# Patient Record
Sex: MALE | Race: WHITE | NOT HISPANIC OR LATINO | Employment: OTHER | URBAN - METROPOLITAN AREA
[De-identification: names, ages, dates, MRNs, and addresses within clinical notes are randomized per-mention and may not be internally consistent; named-entity substitution may affect disease eponyms.]

---

## 2017-04-28 ENCOUNTER — GENERIC CONVERSION - ENCOUNTER (OUTPATIENT)
Dept: OTHER | Facility: OTHER | Age: 67
End: 2017-04-28

## 2017-05-25 ENCOUNTER — ALLSCRIPTS OFFICE VISIT (OUTPATIENT)
Dept: OTHER | Facility: OTHER | Age: 67
End: 2017-05-25

## 2017-08-28 ENCOUNTER — ALLSCRIPTS OFFICE VISIT (OUTPATIENT)
Dept: OTHER | Facility: OTHER | Age: 67
End: 2017-08-28

## 2017-09-25 ENCOUNTER — APPOINTMENT (OUTPATIENT)
Dept: LAB | Facility: MEDICAL CENTER | Age: 67
End: 2017-09-25
Payer: COMMERCIAL

## 2017-09-25 ENCOUNTER — TRANSCRIBE ORDERS (OUTPATIENT)
Dept: ADMINISTRATIVE | Facility: HOSPITAL | Age: 67
End: 2017-09-25

## 2017-09-25 DIAGNOSIS — E78.2 MIXED HYPERLIPIDEMIA: Primary | ICD-10-CM

## 2017-09-25 DIAGNOSIS — E78.2 MIXED HYPERLIPIDEMIA: ICD-10-CM

## 2017-09-25 DIAGNOSIS — I15.9 SECONDARY HYPERTENSION: ICD-10-CM

## 2017-09-25 DIAGNOSIS — I25.119 ATHEROSCLEROSIS OF NATIVE CORONARY ARTERY WITH ANGINA PECTORIS, UNSPECIFIED WHETHER NATIVE OR TRANSPLANTED HEART (HCC): ICD-10-CM

## 2017-09-25 DIAGNOSIS — Z95.1 POSTSURGICAL AORTOCORONARY BYPASS STATUS: ICD-10-CM

## 2017-09-25 LAB
ALBUMIN SERPL BCP-MCNC: 3.3 G/DL (ref 3.5–5)
ALP SERPL-CCNC: 73 U/L (ref 46–116)
ALT SERPL W P-5'-P-CCNC: 31 U/L (ref 12–78)
AST SERPL W P-5'-P-CCNC: 22 U/L (ref 5–45)
BILIRUB DIRECT SERPL-MCNC: 0.2 MG/DL (ref 0–0.2)
BILIRUB SERPL-MCNC: 0.66 MG/DL (ref 0.2–1)
CHOLEST SERPL-MCNC: 126 MG/DL (ref 50–200)
HDLC SERPL-MCNC: 44 MG/DL (ref 40–60)
LDLC SERPL CALC-MCNC: 60 MG/DL (ref 0–100)
PROT SERPL-MCNC: 6.9 G/DL (ref 6.4–8.2)
TRIGL SERPL-MCNC: 110 MG/DL

## 2017-09-25 PROCEDURE — 80061 LIPID PANEL: CPT

## 2017-09-25 PROCEDURE — 36415 COLL VENOUS BLD VENIPUNCTURE: CPT

## 2017-09-25 PROCEDURE — 80076 HEPATIC FUNCTION PANEL: CPT

## 2017-10-11 ENCOUNTER — ALLSCRIPTS OFFICE VISIT (OUTPATIENT)
Dept: OTHER | Facility: OTHER | Age: 67
End: 2017-10-11

## 2018-01-13 VITALS
BODY MASS INDEX: 33.45 KG/M2 | HEIGHT: 75 IN | SYSTOLIC BLOOD PRESSURE: 136 MMHG | WEIGHT: 269 LBS | DIASTOLIC BLOOD PRESSURE: 82 MMHG

## 2018-01-15 VITALS
HEIGHT: 75 IN | SYSTOLIC BLOOD PRESSURE: 114 MMHG | TEMPERATURE: 97.3 F | DIASTOLIC BLOOD PRESSURE: 74 MMHG | BODY MASS INDEX: 32.58 KG/M2 | WEIGHT: 262 LBS

## 2018-09-19 RX ORDER — CARVEDILOL PHOSPHATE 10 MG/1
1 CAPSULE, EXTENDED RELEASE ORAL 2 TIMES DAILY
COMMUNITY
End: 2019-03-25 | Stop reason: SDUPTHER

## 2018-09-19 RX ORDER — OLMESARTAN MEDOXOMIL 40 MG/1
1 TABLET ORAL DAILY
COMMUNITY
End: 2019-03-25 | Stop reason: SDUPTHER

## 2018-09-19 RX ORDER — ACYCLOVIR 50 MG/G
OINTMENT TOPICAL
COMMUNITY
Start: 2017-08-28 | End: 2020-11-16 | Stop reason: ALTCHOICE

## 2018-09-19 RX ORDER — EZETIMIBE 10 MG/1
10 TABLET ORAL
COMMUNITY
End: 2019-03-25 | Stop reason: SDUPTHER

## 2018-09-19 RX ORDER — ROSUVASTATIN CALCIUM 20 MG/1
20 TABLET, COATED ORAL
COMMUNITY
End: 2019-03-25 | Stop reason: SDUPTHER

## 2018-09-19 RX ORDER — MOMETASONE FUROATE 50 UG/1
2 SPRAY, METERED NASAL DAILY
COMMUNITY
Start: 2017-05-25 | End: 2019-03-25 | Stop reason: ALTCHOICE

## 2018-09-19 RX ORDER — ROSUVASTATIN CALCIUM 40 MG/1
1 TABLET, COATED ORAL DAILY
COMMUNITY
End: 2019-03-25 | Stop reason: ALTCHOICE

## 2018-09-19 RX ORDER — LANOLIN ALCOHOL/MO/W.PET/CERES
1 CREAM (GRAM) TOPICAL DAILY
COMMUNITY

## 2018-09-19 RX ORDER — VALACYCLOVIR HYDROCHLORIDE 1 G/1
1 TABLET, FILM COATED ORAL 3 TIMES DAILY
COMMUNITY
Start: 2017-08-28 | End: 2019-03-25 | Stop reason: ALTCHOICE

## 2018-09-24 ENCOUNTER — OFFICE VISIT (OUTPATIENT)
Dept: CARDIOLOGY CLINIC | Facility: CLINIC | Age: 68
End: 2018-09-24
Payer: COMMERCIAL

## 2018-09-24 VITALS
SYSTOLIC BLOOD PRESSURE: 130 MMHG | HEART RATE: 61 BPM | WEIGHT: 263.8 LBS | DIASTOLIC BLOOD PRESSURE: 70 MMHG | OXYGEN SATURATION: 98 % | HEIGHT: 75 IN | BODY MASS INDEX: 32.8 KG/M2

## 2018-09-24 DIAGNOSIS — E78.00 HYPERCHOLESTEREMIA: ICD-10-CM

## 2018-09-24 DIAGNOSIS — I10 ESSENTIAL HYPERTENSION: ICD-10-CM

## 2018-09-24 DIAGNOSIS — Z95.1 HX OF CABG: Primary | ICD-10-CM

## 2018-09-24 PROCEDURE — 99214 OFFICE O/P EST MOD 30 MIN: CPT | Performed by: INTERNAL MEDICINE

## 2018-09-24 PROCEDURE — 93000 ELECTROCARDIOGRAM COMPLETE: CPT | Performed by: INTERNAL MEDICINE

## 2018-09-24 NOTE — PROGRESS NOTES
Cardiology Follow Up    Shaggy White    1950  [id number]  [address] [facility]  [address] [place] [id number] [phone]    1  Hx of CABG in 2001  Lipid Panel with Direct LDL reflex   2  Essential hypertension  POCT ECG   3  Hypercholesteremia  Lipid Panel with Direct LDL reflex       Patient is from my prior practice  He has a history of coronary artery disease with CABG in 2001  Other diagnoses include hypertension, hyper lipidemia and sleep apnea  04/17/2013 stress test:  Exercised for 9 min by standard Milton protocol, normal LV systolic function 02%  Normal tomographic perfusion series  04/17/2013 echocardiogram:  Normal LV function, EF 55-59%, mild LV cavity enlargement  Mild concentric LVH  Severe left atrial enlargement  Aortic sclerosis  Interval History:   Patient is having no cardiac symptoms  He denies chest pain, shortness of breath, dizziness or lightheadedness, palpitations or leg edema  He and his wife are upset that they were denied life insurance due to having prior CABG  Patient Active Problem List   Diagnosis    ARMD (age related macular degeneration)    Hypercholesteremia    Hypertension    Coronary artery disease    Hx of CABG in 2001     Past Medical History:   Diagnosis Date    CAD (coronary artery disease)      Social History     Social History    Marital status: /Civil Union     Spouse name: N/A    Number of children: N/A    Years of education: N/A     Occupational History    Not on file  Social History Main Topics    Smoking status: Never Smoker    Smokeless tobacco: Not on file    Alcohol use Not on file    Drug use: Unknown    Sexual activity: Not on file     Other Topics Concern    Not on file     Social History Narrative    No narrative on file      No family history on file    Past Surgical History:   Procedure Laterality Date    ADENOIDECTOMY      CARPAL TUNNEL RELEASE Bilateral     CORONARY ARTERY BYPASS GRAFT  05/2001    x4    NASAL SEPTUM SURGERY      TONSILLECTOMY         Current Outpatient Prescriptions:     aspirin 81 MG tablet, Take by mouth, Disp: , Rfl:     carvedilol (COREG CR) 10 MG 24 hr capsule, Take 1 capsule by mouth 2 (two) times a day, Disp: , Rfl:     Docosahexaenoic Acid (DHA OMEGA 3) 100 MG CAPS, Take by mouth daily, Disp: , Rfl:     ezetimibe (ZETIA) 10 mg tablet, Take 10 mg by mouth, Disp: , Rfl:     folic acid (FOLVITE) 356 mcg tablet, Take 1 tablet by mouth daily, Disp: , Rfl:     Multiple Vitamin (DAILY VALUE MULTIVITAMIN PO), Take 1 tablet by mouth daily, Disp: , Rfl:     olmesartan (BENICAR) 40 mg tablet, Take 1 tablet by mouth daily, Disp: , Rfl:     Omega-3 Fatty Acids (FISH OIL PO), Take 2 g by mouth, Disp: , Rfl:     rosuvastatin (CRESTOR) 20 MG tablet, Take 20 mg by mouth, Disp: , Rfl:     acyclovir (ZOVIRAX) 5 % ointment, Apply topically, Disp: , Rfl:     mometasone (NASONEX) 50 mcg/act nasal spray, 2 sprays into each nostril daily, Disp: , Rfl:     rosuvastatin (CRESTOR) 40 MG tablet, Take 1 tablet by mouth daily, Disp: , Rfl:     valACYclovir (VALTREX) 1,000 mg tablet, Take 1 tablet by mouth 3 (three) times a day, Disp: , Rfl:   No Known Allergies    Results for orders placed or performed in visit on 09/24/18   POCT ECG    Narrative    Normal sinus rhythm at a rate of 61 beats per minute  Nonspecific ST T wave changes           Lab Results   Component Value Date    CHOL 124 08/14/2015    CHOL 126 07/23/2014     Lab Results   Component Value Date    HDL 44 09/25/2017    HDL 42 09/12/2016    HDL 43 08/14/2015     Lab Results   Component Value Date    LDLCALC 60 09/25/2017    LDLCALC 77 09/12/2016    LDLCALC 56 08/14/2015     Lab Results   Component Value Date    TRIG 110 09/25/2017    TRIG 148 09/12/2016    TRIG 125 08/14/2015     No components found for: CHOLHDL  Lab Results   Component Value Date    GLUCOSE 83 08/14/2015    CALCIUM 9 1 08/14/2015     08/14/2015    K 4 0 08/14/2015    CO2 27 5 08/14/2015     08/14/2015    BUN 18 08/14/2015    CREATININE 0 99 08/14/2015     Lab Results   Component Value Date     08/14/2015    K 4 0 08/14/2015     08/14/2015    CO2 27 5 08/14/2015    ANIONGAP 10 08/14/2015    BUN 18 08/14/2015    CREATININE 0 99 08/14/2015    GLUCOSE 83 08/14/2015    CALCIUM 9 1 08/14/2015    AST 22 09/25/2017    ALT 31 09/25/2017    ALKPHOS 73 09/25/2017    PROT 6 7 08/14/2015    BILITOT 1 36 (H) 08/14/2015         Review of Systems:  Review of Systems   Constitutional: Negative  HENT: Negative  Eyes: Negative  Respiratory: Negative for chest tightness and shortness of breath  Cardiovascular: Negative for chest pain, palpitations and leg swelling  Gastrointestinal: Negative  Endocrine: Negative  Musculoskeletal: Negative  Skin: Negative  Allergic/Immunologic: Negative  Neurological: Negative  Hematological: Negative  Psychiatric/Behavioral: Negative  Physical Exam:  /70 (BP Location: Right arm, Patient Position: Sitting, Cuff Size: Adult)   Pulse 61   Ht 6' 3" (1 905 m)   Wt 120 kg (263 lb 12 8 oz)   SpO2 98%   BMI 32 97 kg/m²   Physical Exam   Constitutional: He is oriented to person, place, and time  He appears well-developed and well-nourished  HENT:   Head: Normocephalic and atraumatic  Neck: Normal range of motion  Neck supple  No JVD present  No tracheal deviation present  Cardiovascular: Normal rate, regular rhythm, normal heart sounds and intact distal pulses  Pulmonary/Chest: Effort normal and breath sounds normal  No respiratory distress  He has no wheezes  He has no rales  Abdominal: Soft  Bowel sounds are normal    Musculoskeletal: He exhibits no edema  Neurological: He is alert and oriented to person, place, and time  Skin: Skin is warm and dry     Psychiatric: He has a normal mood and affect  His behavior is normal        Discussion/Summary:  1  Status post CABG  2  Hypertension well controlled  3    Hyperlipidemia

## 2018-09-27 ENCOUNTER — CLINICAL SUPPORT (OUTPATIENT)
Dept: FAMILY MEDICINE CLINIC | Facility: CLINIC | Age: 68
End: 2018-09-27
Payer: COMMERCIAL

## 2018-09-27 ENCOUNTER — APPOINTMENT (OUTPATIENT)
Dept: LAB | Facility: MEDICAL CENTER | Age: 68
End: 2018-09-27
Payer: COMMERCIAL

## 2018-09-27 DIAGNOSIS — Z95.1 HX OF CABG: ICD-10-CM

## 2018-09-27 DIAGNOSIS — Z23 NEED FOR INFLUENZA VACCINATION: Primary | ICD-10-CM

## 2018-09-27 DIAGNOSIS — E78.00 HYPERCHOLESTEREMIA: ICD-10-CM

## 2018-09-27 LAB
CHOLEST SERPL-MCNC: 129 MG/DL (ref 50–200)
HDLC SERPL-MCNC: 41 MG/DL (ref 40–60)
LDLC SERPL CALC-MCNC: 63 MG/DL (ref 0–100)
TRIGL SERPL-MCNC: 127 MG/DL

## 2018-09-27 PROCEDURE — 4040F PNEUMOC VAC/ADMIN/RCVD: CPT

## 2018-09-27 PROCEDURE — 90662 IIV NO PRSV INCREASED AG IM: CPT

## 2018-09-27 PROCEDURE — 36415 COLL VENOUS BLD VENIPUNCTURE: CPT

## 2018-09-27 PROCEDURE — G0008 ADMIN INFLUENZA VIRUS VAC: HCPCS

## 2018-09-27 PROCEDURE — 80061 LIPID PANEL: CPT

## 2019-03-20 RX ORDER — IMIQUIMOD 12.5 MG/.25G
CREAM TOPICAL
Refills: 0 | COMMUNITY
Start: 2018-12-27 | End: 2020-11-16 | Stop reason: ALTCHOICE

## 2019-03-25 ENCOUNTER — OFFICE VISIT (OUTPATIENT)
Dept: CARDIOLOGY CLINIC | Facility: CLINIC | Age: 69
End: 2019-03-25
Payer: COMMERCIAL

## 2019-03-25 VITALS
DIASTOLIC BLOOD PRESSURE: 80 MMHG | OXYGEN SATURATION: 98 % | BODY MASS INDEX: 20.86 KG/M2 | WEIGHT: 167.8 LBS | HEART RATE: 72 BPM | HEIGHT: 75 IN | SYSTOLIC BLOOD PRESSURE: 124 MMHG

## 2019-03-25 DIAGNOSIS — E78.00 HYPERCHOLESTEREMIA: ICD-10-CM

## 2019-03-25 DIAGNOSIS — E78.00 HYPERCHOLESTEREMIA: Primary | ICD-10-CM

## 2019-03-25 DIAGNOSIS — I25.10 CORONARY ARTERY DISEASE INVOLVING NATIVE CORONARY ARTERY OF NATIVE HEART WITHOUT ANGINA PECTORIS: ICD-10-CM

## 2019-03-25 DIAGNOSIS — Z95.1 HX OF CABG: ICD-10-CM

## 2019-03-25 DIAGNOSIS — I25.10 CORONARY ARTERY DISEASE INVOLVING NATIVE CORONARY ARTERY OF NATIVE HEART WITHOUT ANGINA PECTORIS: Primary | ICD-10-CM

## 2019-03-25 DIAGNOSIS — I10 ESSENTIAL HYPERTENSION: ICD-10-CM

## 2019-03-25 PROCEDURE — 99215 OFFICE O/P EST HI 40 MIN: CPT | Performed by: INTERNAL MEDICINE

## 2019-03-25 NOTE — PROGRESS NOTES
Cardiology Follow Up    Ignacia Montaño    1950  [id number]  [address] [facility]  [address] [place] [id number] [phone]    1  Coronary artery disease involving native coronary artery of native heart without angina pectoris     2  Hx of CABG in 2001     3  Essential hypertension     4  Hypercholesteremia         Patient is from my prior practice  He has a history of coronary artery disease with CABG in 2001  Other diagnoses include hypertension, hyper lipidemia and sleep apnea  04/17/2013 stress test:  Exercised for 9 min by standard [place] protocol, normal LV systolic function 12%  Normal tomographic perfusion series  04/17/2013 echocardiogram:  Normal LV function, EF 55-59%, mild LV cavity enlargement  Mild concentric LVH  Severe left atrial enlargement  Aortic sclerosis  Interval History:  Patient returns  He has no chest discomfort or shortness of breath  He denies palpitations  He denies dizziness, lightheadedness or near-syncope/syncope  The patient has wet macular degeneration  Both the patient and his wife are concerned that the aspirin he takes may increase the bleeding into the retina from the wet macular degeneration  He is getting shots into the eye every 6 weeks  They talked to their ophthalmologist and he would not discuss whether there is a relationship between wet macular degeneration and aspirin  If there is a relationship, I would not hesitate to stop the aspirin but if there is no relationship since the patient has coronary artery disease, I do not want to stop the aspirin unnecessarily      Patient Active Problem List   Diagnosis    ARMD (age related macular degeneration)    Hypercholesteremia    Hypertension    CAD (coronary artery disease)    Hx of CABG in 2001     Past Medical History:   Diagnosis Date    CAD (coronary artery disease) Social History     Socioeconomic History    Marital status: /Civil Union     Spouse name: Not on file    Number of children: Not on file    Years of education: Not on file    Highest education level: Not on file   Occupational History    Not on file   Social Needs    Financial resource strain: Not on file    Food insecurity:     Worry: Not on file     Inability: Not on file    Transportation needs:     Medical: Not on file     Non-medical: Not on file   Tobacco Use    Smoking status: Never Smoker   Substance and Sexual Activity    Alcohol use: Not on file    Drug use: Not on file    Sexual activity: Not on file   Lifestyle    Physical activity:     Days per week: Not on file     Minutes per session: Not on file    Stress: Not on file   Relationships    Social connections:     Talks on phone: Not on file     Gets together: Not on file     Attends Latter day service: Not on file     Active member of club or organization: Not on file     Attends meetings of clubs or organizations: Not on file     Relationship status: Not on file    Intimate partner violence:     Fear of current or ex partner: Not on file     Emotionally abused: Not on file     Physically abused: Not on file     Forced sexual activity: Not on file   Other Topics Concern    Not on file   Social History Narrative    Not on file      No family history on file    Past Surgical History:   Procedure Laterality Date    ADENOIDECTOMY      CARPAL TUNNEL RELEASE Bilateral     CORONARY ARTERY BYPASS GRAFT  05/2001    x4    NASAL SEPTUM SURGERY      TONSILLECTOMY         Current Outpatient Medications:     acyclovir (ZOVIRAX) 5 % ointment, Apply topically, Disp: , Rfl:     aspirin 81 MG tablet, Take by mouth, Disp: , Rfl:     carvedilol (COREG CR) 10 MG 24 hr capsule, Take 1 capsule by mouth 2 (two) times a day, Disp: , Rfl:     Docosahexaenoic Acid (DHA OMEGA 3) 100 MG CAPS, Take by mouth daily, Disp: , Rfl:     ezetimibe (ZETIA) 10 mg tablet, Take 10 mg by mouth, Disp: , Rfl:     folic acid (FOLVITE) 657 mcg tablet, Take 1 tablet by mouth daily, Disp: , Rfl:     imiquimod (ALDARA) 5 % cream, , Disp: , Rfl: 0    Multiple Vitamin (DAILY VALUE MULTIVITAMIN PO), Take 1 tablet by mouth daily, Disp: , Rfl:     olmesartan (BENICAR) 40 mg tablet, Take 1 tablet by mouth daily, Disp: , Rfl:     Omega-3 Fatty Acids (FISH OIL PO), Take 2 g by mouth, Disp: , Rfl:     rosuvastatin (CRESTOR) 20 MG tablet, Take 20 mg by mouth, Disp: , Rfl:   No Known Allergies    No results found for this visit on 03/25/19  Lab Results   Component Value Date    CHOL 124 08/14/2015    CHOL 126 07/23/2014     Lab Results   Component Value Date    HDL 41 09/27/2018    HDL 44 09/25/2017    HDL 42 09/12/2016     Lab Results   Component Value Date    LDLCALC 63 09/27/2018    LDLCALC 60 09/25/2017    LDLCALC 77 09/12/2016     Lab Results   Component Value Date    TRIG 127 09/27/2018    TRIG 110 09/25/2017    TRIG 148 09/12/2016     No results found for: CHOLHDL  Lab Results   Component Value Date    GLUCOSE 83 08/14/2015    CALCIUM 9 1 08/14/2015     08/14/2015    K 4 0 08/14/2015    CO2 27 5 08/14/2015     08/14/2015    BUN 18 08/14/2015    CREATININE 0 99 08/14/2015     Lab Results   Component Value Date     08/14/2015    K 4 0 08/14/2015     08/14/2015    CO2 27 5 08/14/2015    ANIONGAP 10 08/14/2015    BUN 18 08/14/2015    CREATININE 0 99 08/14/2015    CALCIUM 9 1 08/14/2015    AST 22 09/25/2017    ALT 31 09/25/2017    ALKPHOS 73 09/25/2017    PROT 6 7 08/14/2015    TP 6 9 09/25/2017    BILITOT 1 36 (H) 08/14/2015    TBILI 0 66 09/25/2017         Review of Systems:  Review of Systems   Constitutional: Negative for activity change  Respiratory: Negative for cough, chest tightness, shortness of breath and wheezing  Cardiovascular: Negative for chest pain, palpitations and leg swelling  Musculoskeletal: Negative for gait problem  Skin: Negative for color change  Neurological: Negative for dizziness, tremors, syncope, weakness, light-headedness and headaches  Psychiatric/Behavioral: Negative for agitation and confusion  Physical Exam:  /80   Pulse 72   Ht 6' 3" (1 905 m)   Wt 76 1 kg (167 lb 12 8 oz)   SpO2 98%   BMI 20 97 kg/m²   Physical Exam   Constitutional: He is oriented to person, place, and time  He appears well-developed and well-nourished  No distress  HENT:   Head: Normocephalic and atraumatic  Neck: No JVD present  Cardiovascular: Normal rate, regular rhythm, normal heart sounds and intact distal pulses  Exam reveals no gallop and no friction rub  No murmur heard  Pulmonary/Chest: Effort normal and breath sounds normal  No respiratory distress  He has no wheezes  He has no rales  He exhibits no tenderness  Abdominal: Soft  Bowel sounds are normal  He exhibits no distension  Musculoskeletal: He exhibits no edema  Neurological: He is alert and oriented to person, place, and time  Skin: Skin is warm and dry  Psychiatric: He has a normal mood and affect  His behavior is normal        Discussion/Summary:  1  Will see if I can find out anything about aspirin and wet macular degeneration  2  Return in 6 months

## 2019-03-27 RX ORDER — ROSUVASTATIN CALCIUM 20 MG/1
20 TABLET, COATED ORAL DAILY
Qty: 90 TABLET | Refills: 2 | Status: SHIPPED | OUTPATIENT
Start: 2019-03-27

## 2019-03-27 RX ORDER — EZETIMIBE 10 MG/1
10 TABLET ORAL DAILY
Qty: 90 TABLET | Refills: 3 | Status: SHIPPED | OUTPATIENT
Start: 2019-03-27

## 2019-03-27 RX ORDER — CARVEDILOL PHOSPHATE 10 MG/1
10 CAPSULE, EXTENDED RELEASE ORAL 2 TIMES DAILY
Qty: 180 CAPSULE | Refills: 2 | Status: SHIPPED | OUTPATIENT
Start: 2019-03-27

## 2019-03-27 RX ORDER — OLMESARTAN MEDOXOMIL 40 MG/1
40 TABLET ORAL DAILY
Qty: 90 TABLET | Refills: 2 | Status: SHIPPED | OUTPATIENT
Start: 2019-03-27

## 2019-08-06 ENCOUNTER — TELEPHONE (OUTPATIENT)
Dept: CARDIOLOGY CLINIC | Facility: CLINIC | Age: 69
End: 2019-08-06

## 2019-08-06 NOTE — TELEPHONE ENCOUNTER
Wife called request release of medical records to Dr Celi Lane  Mailed medical release form to home to be signed with already stamped return envelope  Explained a signed release is necessay

## 2019-08-06 NOTE — TELEPHONE ENCOUNTER
Wife called back are driving to office to sign release   pulled addressed envelope from mail and gave to  to have when patient arrives

## 2019-09-04 ENCOUNTER — APPOINTMENT (OUTPATIENT)
Dept: LAB | Facility: MEDICAL CENTER | Age: 69
End: 2019-09-04
Payer: COMMERCIAL

## 2019-09-04 ENCOUNTER — TRANSCRIBE ORDERS (OUTPATIENT)
Dept: ADMINISTRATIVE | Facility: HOSPITAL | Age: 69
End: 2019-09-04

## 2019-09-04 DIAGNOSIS — E78.00 HYPERCHOLESTEREMIA: ICD-10-CM

## 2019-09-04 DIAGNOSIS — I25.10 CORONARY ARTERY DISEASE INVOLVING NATIVE CORONARY ARTERY OF NATIVE HEART WITHOUT ANGINA PECTORIS: ICD-10-CM

## 2019-09-04 DIAGNOSIS — E78.00 HYPERCHOLESTEREMIA: Primary | ICD-10-CM

## 2019-09-04 LAB
CHOLEST SERPL-MCNC: 150 MG/DL (ref 50–200)
HDLC SERPL-MCNC: 41 MG/DL (ref 40–60)
LDLC SERPL CALC-MCNC: 81 MG/DL (ref 0–100)
NONHDLC SERPL-MCNC: 109 MG/DL
TRIGL SERPL-MCNC: 138 MG/DL

## 2019-09-04 PROCEDURE — 36415 COLL VENOUS BLD VENIPUNCTURE: CPT

## 2019-09-04 PROCEDURE — 80061 LIPID PANEL: CPT

## 2020-02-26 ENCOUNTER — APPOINTMENT (OUTPATIENT)
Dept: LAB | Facility: HOSPITAL | Age: 70
End: 2020-02-26
Payer: COMMERCIAL

## 2020-02-26 ENCOUNTER — TRANSCRIBE ORDERS (OUTPATIENT)
Dept: ADMINISTRATIVE | Facility: HOSPITAL | Age: 70
End: 2020-02-26

## 2020-02-26 ENCOUNTER — HOSPITAL ENCOUNTER (OUTPATIENT)
Dept: NON INVASIVE DIAGNOSTICS | Facility: HOSPITAL | Age: 70
Discharge: HOME | End: 2020-02-26
Payer: COMMERCIAL

## 2020-02-26 DIAGNOSIS — M79.641 RIGHT HAND PAIN: ICD-10-CM

## 2020-02-26 DIAGNOSIS — Z01.818 OTHER SPECIFIED PRE-OPERATIVE EXAMINATION: ICD-10-CM

## 2020-02-26 DIAGNOSIS — M79.641 RIGHT HAND PAIN: Primary | ICD-10-CM

## 2020-02-26 DIAGNOSIS — Z01.810 PRE-OPERATIVE CARDIOVASCULAR EXAMINATION: ICD-10-CM

## 2020-02-26 LAB
ANION GAP SERPL CALCULATED.3IONS-SCNC: 8 MMOL/L (ref 4–13)
BUN SERPL-MCNC: 15 MG/DL (ref 5–25)
CALCIUM SERPL-MCNC: 9.1 MG/DL (ref 8.3–10.1)
CHLORIDE SERPL-SCNC: 105 MMOL/L (ref 100–108)
CO2 SERPL-SCNC: 28 MMOL/L (ref 21–32)
CREAT SERPL-MCNC: 1 MG/DL (ref 0.6–1.3)
ERYTHROCYTE [DISTWIDTH] IN BLOOD BY AUTOMATED COUNT: 12.6 % (ref 11.6–15.1)
GFR SERPL CREATININE-BSD FRML MDRD: 76 ML/MIN/1.73SQ M
GLUCOSE SERPL-MCNC: 110 MG/DL (ref 65–140)
HCT VFR BLD AUTO: 42.8 % (ref 36.5–49.3)
HGB BLD-MCNC: 14.3 G/DL (ref 12–17)
MCH RBC QN AUTO: 31.9 PG (ref 26.8–34.3)
MCHC RBC AUTO-ENTMCNC: 33.4 G/DL (ref 31.4–37.4)
MCV RBC AUTO: 96 FL (ref 82–98)
PLATELET # BLD AUTO: 296 THOUSANDS/UL (ref 149–390)
PMV BLD AUTO: 10 FL (ref 8.9–12.7)
POTASSIUM SERPL-SCNC: 4 MMOL/L (ref 3.5–5.3)
RBC # BLD AUTO: 4.48 MILLION/UL (ref 3.88–5.62)
SODIUM SERPL-SCNC: 141 MMOL/L (ref 136–145)
WBC # BLD AUTO: 7.42 THOUSAND/UL (ref 4.31–10.16)

## 2020-02-26 PROCEDURE — 85027 COMPLETE CBC AUTOMATED: CPT

## 2020-02-26 PROCEDURE — 80048 BASIC METABOLIC PNL TOTAL CA: CPT

## 2020-02-26 PROCEDURE — 36415 COLL VENOUS BLD VENIPUNCTURE: CPT

## 2020-02-26 PROCEDURE — 93005 ELECTROCARDIOGRAM TRACING: CPT

## 2020-02-27 ENCOUNTER — TELEPHONE (OUTPATIENT)
Dept: FAMILY MEDICINE CLINIC | Facility: CLINIC | Age: 70
End: 2020-02-27

## 2020-02-27 LAB
ATRIAL RATE: 74 BPM
P AXIS: 79 DEGREES
PR INTERVAL: 150 MS
QRS AXIS: 78 DEGREES
QRSD INTERVAL: 98 MS
QT INTERVAL: 414 MS
QTC INTERVAL: 459 MS
T WAVE AXIS: 88 DEGREES
VENTRICULAR RATE: 74 BPM

## 2020-02-27 PROCEDURE — 93010 ELECTROCARDIOGRAM REPORT: CPT | Performed by: INTERNAL MEDICINE

## 2020-02-27 NOTE — TELEPHONE ENCOUNTER
Patient is having had surgery and he had an ekg, which showed st & t wave abnormality    His ekg from 2018 said st abnormality   He is upset, he wants your opinion and he is going to call his cardiologist

## 2020-02-28 NOTE — TELEPHONE ENCOUNTER
I looked at both the tracings from 2019 and this 1 and while there have been some very minimal changes I do not see anything that is so serious that it makes me want to put him in the hospital I do not think he has a high risk for an impending heart attack however I totally agree he should make his cardiologist aware of this and he should get his cardiologist clearance for any surgery

## 2020-08-22 ENCOUNTER — HOSPITAL ENCOUNTER (EMERGENCY)
Facility: HOSPITAL | Age: 70
Discharge: HOME | End: 2020-08-22
Attending: EMERGENCY MEDICINE
Payer: COMMERCIAL

## 2020-08-22 ENCOUNTER — APPOINTMENT (EMERGENCY)
Dept: RADIOLOGY | Facility: HOSPITAL | Age: 70
End: 2020-08-22
Payer: COMMERCIAL

## 2020-08-22 VITALS
DIASTOLIC BLOOD PRESSURE: 80 MMHG | BODY MASS INDEX: 33.55 KG/M2 | WEIGHT: 269.84 LBS | RESPIRATION RATE: 18 BRPM | HEIGHT: 75 IN | HEART RATE: 61 BPM | OXYGEN SATURATION: 96 % | TEMPERATURE: 98 F | SYSTOLIC BLOOD PRESSURE: 135 MMHG
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
## 2020-08-22 DIAGNOSIS — S49.91XA RIGHT SHOULDER INJURY: ICD-10-CM

## 2020-08-22 DIAGNOSIS — S50.319A ELBOW ABRASION: ICD-10-CM

## 2020-08-22 PROCEDURE — 90715 TDAP VACCINE 7 YRS/> IM: CPT | Performed by: PHYSICIAN ASSISTANT

## 2020-08-22 PROCEDURE — 96372 THER/PROPH/DIAG INJ SC/IM: CPT

## 2020-08-22 PROCEDURE — 90471 IMMUNIZATION ADMIN: CPT

## 2020-08-22 PROCEDURE — 99284 EMERGENCY DEPT VISIT MOD MDM: CPT | Performed by: PHYSICIAN ASSISTANT

## 2020-08-22 PROCEDURE — 99284 EMERGENCY DEPT VISIT MOD MDM: CPT

## 2020-08-22 PROCEDURE — 73030 X-RAY EXAM OF SHOULDER: CPT

## 2020-08-22 PROCEDURE — 73080 X-RAY EXAM OF ELBOW: CPT

## 2020-08-22 RX ORDER — KETOROLAC TROMETHAMINE 30 MG/ML
30 INJECTION, SOLUTION INTRAMUSCULAR; INTRAVENOUS ONCE
Status: COMPLETED | OUTPATIENT
Start: 2020-08-22 | End: 2020-08-22

## 2020-08-22 RX ADMIN — TETANUS TOXOID, REDUCED DIPHTHERIA TOXOID AND ACELLULAR PERTUSSIS VACCINE, ADSORBED 0.5 ML: 5; 2.5; 8; 8; 2.5 SUSPENSION INTRAMUSCULAR at 10:08

## 2020-08-22 RX ADMIN — KETOROLAC TROMETHAMINE 30 MG: 30 INJECTION, SOLUTION INTRAMUSCULAR at 10:56

## 2020-08-22 NOTE — ED PROVIDER NOTES
History  Chief Complaint   Patient presents with   Julian Fall     While walking twisted ankle and fell onto right side  C/o pain right shoulder , elbow  Patient presents to the emergency department today for evaluation of right shoulder and right elbow pain status post mechanical fall  He presents ambulatory via private vehicle with his wife  He provides his own history stating within the last hour while out for a morning walk, he tripped over some uneven ground causing to fall on the right shoulder right elbow  He initially states the left ankle inverted but he denies any pain and has been ambulatory since the fall  He denies striking his head neck or back and denies pain in those regions  He denies any chest pain shortness of breath abdominal pain nausea vomiting  He denies any pain other than the right shoulder right elbow pain  Abrasion right elbow  Unsure of last tetanus update  No daily blood thinners  Prior to Admission Medications   Prescriptions Last Dose Informant Patient Reported? Taking?    Docosahexaenoic Acid (DHA OMEGA 3) 100 MG CAPS  Self Yes Yes   Sig: Take by mouth daily   Multiple Vitamin (DAILY VALUE MULTIVITAMIN PO)  Self Yes Yes   Sig: Take 1 tablet by mouth daily   Multiple Vitamins-Minerals (PRESERVISION AREDS 2 PO)   Yes Yes   Sig: Take 1 capsule by mouth 2 (two) times a day   Omega-3 Fatty Acids (FISH OIL PO)  Self Yes No   Sig: Take 2 g by mouth   acyclovir (ZOVIRAX) 5 % ointment  Self Yes No   Sig: Apply topically   aspirin 81 MG tablet  Self Yes Yes   Sig: Take by mouth   carvedilol (COREG CR) 10 MG 24 hr capsule   No Yes   Sig: Take 1 capsule (10 mg total) by mouth 2 (two) times a day   ezetimibe (ZETIA) 10 mg tablet   No Yes   Sig: Take 1 tablet (10 mg total) by mouth daily   folic acid (FOLVITE) 309 mcg tablet  Self Yes Yes   Sig: Take 1 tablet by mouth daily   imiquimod (ALDARA) 5 % cream  Self Yes No   olmesartan (BENICAR) 40 mg tablet   No Yes   Sig: Take 1 tablet (40 mg total) by mouth daily   rosuvastatin (CRESTOR) 20 MG tablet   No Yes   Sig: Take 1 tablet (20 mg total) by mouth daily      Facility-Administered Medications: None       Past Medical History:   Diagnosis Date    CAD (coronary artery disease)        Past Surgical History:   Procedure Laterality Date    ADENOIDECTOMY      CARPAL TUNNEL RELEASE Bilateral     CORONARY ARTERY BYPASS GRAFT  05/2001    x4    NASAL SEPTUM SURGERY      TONSILLECTOMY         History reviewed  No pertinent family history  I have reviewed and agree with the history as documented  E-Cigarette/Vaping    E-Cigarette Use Never User      E-Cigarette/Vaping Substances     Social History     Tobacco Use    Smoking status: Never Smoker    Smokeless tobacco: Never Used   Substance Use Topics    Alcohol use: Yes     Frequency: 2-4 times a month    Drug use: Never       Review of Systems   Constitutional: Negative  Negative for activity change, appetite change, chills, diaphoresis, fatigue, fever and unexpected weight change  HENT: Negative  Negative for sore throat, trouble swallowing and voice change  Eyes: Negative  Respiratory: Negative  Negative for cough, chest tightness, shortness of breath and wheezing  Cardiovascular: Negative  Negative for chest pain, palpitations and leg swelling  Gastrointestinal: Negative  Negative for abdominal pain, blood in stool, nausea and vomiting  Endocrine: Negative  Genitourinary: Negative  Negative for flank pain and hematuria  Musculoskeletal: Negative for arthralgias, back pain, gait problem, joint swelling, myalgias, neck pain and neck stiffness  Right shoulder right elbow pain   Skin: Positive for wound  Negative for rash  Wound right elbow   Allergic/Immunologic: Negative  Neurological: Negative  Negative for dizziness, seizures, syncope, weakness, light-headedness and headaches  Hematological: Negative  Psychiatric/Behavioral: Negative  All other systems reviewed and are negative  Physical Exam  Physical Exam  Vitals signs reviewed  Constitutional:       General: He is not in acute distress  Appearance: He is well-developed  He is not ill-appearing or diaphoretic  HENT:      Right Ear: External ear normal  No swelling  Tympanic membrane is not bulging  Left Ear: External ear normal  No swelling  Tympanic membrane is not bulging  Nose: Nose normal       Mouth/Throat:      Pharynx: No oropharyngeal exudate  Eyes:      General: Lids are normal       Conjunctiva/sclera: Conjunctivae normal       Pupils: Pupils are equal, round, and reactive to light  Neck:      Musculoskeletal: Normal range of motion and neck supple  Normal range of motion  No edema  Thyroid: No thyromegaly  Vascular: No JVD  Trachea: No tracheal deviation  Cardiovascular:      Rate and Rhythm: Normal rate and regular rhythm  Pulses: Normal pulses  Heart sounds: Normal heart sounds  No murmur  No friction rub  No gallop  Pulmonary:      Effort: Pulmonary effort is normal  No respiratory distress  Breath sounds: Normal breath sounds  No stridor  No wheezing or rales  Chest:      Chest wall: No tenderness  Abdominal:      General: Bowel sounds are normal  There is no distension  Palpations: Abdomen is soft  There is no mass  Tenderness: There is no abdominal tenderness  There is no guarding or rebound  Negative signs include Hutchins's sign  Hernia: No hernia is present  Musculoskeletal: Normal range of motion  General: Tenderness present  Comments: Tenderness right anterior elbow  No clavicular tenderness  No scapular tenderness  No mid shaft humerus tenderness  There is minimal right elbow tenderness  Normal radial pulse  Capillary refill is brisk less than 2 seconds with normal sensation   Lymphadenopathy:      Cervical: No cervical adenopathy     Skin:     General: Skin is warm and dry       Capillary Refill: Capillary refill takes less than 2 seconds  Coloration: Skin is not pale  Findings: No erythema or rash  Comments: Abrasion superficial right olecranon   Neurological:      Mental Status: He is alert and oriented to person, place, and time  GCS: GCS eye subscore is 4  GCS verbal subscore is 5  GCS motor subscore is 6  Cranial Nerves: No cranial nerve deficit  Sensory: No sensory deficit  Deep Tendon Reflexes: Reflexes are normal and symmetric  Psychiatric:         Speech: Speech normal          Behavior: Behavior normal          Vital Signs  ED Triage Vitals [08/22/20 0949]   Temperature Pulse Respirations Blood Pressure SpO2   98 °F (36 7 °C) 70 16 (!) 185/103 97 %      Temp Source Heart Rate Source Patient Position - Orthostatic VS BP Location FiO2 (%)   Temporal Monitor Sitting Left arm --      Pain Score       8           Vitals:    08/22/20 0949 08/22/20 1009 08/22/20 1015   BP: (!) 185/103 145/76 133/73   Pulse: 70 64 65   Patient Position - Orthostatic VS: Sitting Sitting Sitting         Visual Acuity  Visual Acuity      Most Recent Value   L Pupil Size (mm)  3   R Pupil Size (mm)  3          ED Medications  Medications   ketorolac (TORADOL) injection 30 mg (has no administration in time range)   tetanus-diphtheria-acellular pertussis (BOOSTRIX) IM injection 0 5 mL (0 5 mL Intramuscular Given 8/22/20 1008)       Diagnostic Studies  Results Reviewed     None                 XR shoulder 2+ views RIGHT   ED Interpretation by Gal Owusu PA-C (08/22 1031)   No evidence of fracture or dislocation  XR elbow 3+ vw RIGHT   ED Interpretation by Gal Owusu PA-C (08/22 1031)   No evidence of joint effusion or displaced fracture                   Procedures  Procedures         ED Course  ED Course as of Aug 22 1040   Sat Aug 22, 2020   4750 Blood Pressure(!): 185/103   0954 Temperature: 98 °F (36 7 °C)   0954 Pulse: 70   0954 Respirations: 16   0954 SpO2: 97 %       US AUDIT      Most Recent Value   Initial Alcohol Screen: US AUDIT-C    1  How often do you have a drink containing alcohol? 2 Filed at: 08/22/2020 0958   2  How many drinks containing alcohol do you have on a typical day you are drinking? 1 Filed at: 08/22/2020 0958   3b  FEMALE Any Age, or MALE 65+: How often do you have 4 or more drinks on one occassion? 0 Filed at: 08/22/2020 0958   Audit-C Score  3 Filed at: 08/22/2020 6646                  DONTAE/DAST-10      Most Recent Value   How many times in the past year have you    Used an illegal drug or used a prescription medication for non-medical reasons? Never Filed at: 08/22/2020 7671                                MDM      Disposition  Final diagnoses:   Fall, initial encounter   Elbow abrasion   Right shoulder injury     Time reflects when diagnosis was documented in both MDM as applicable and the Disposition within this note     Time User Action Codes Description Comment    8/22/2020 10:35 AM Anastacia ROMERO Add [V23  GZOI] Fall, initial encounter     8/22/2020 10:35 AM Anastacia ROMERO Add [M39 676Y] Elbow abrasion     8/22/2020 10:35 AM Peter Trevino Add [V74 [id number]] Right shoulder injury       ED Disposition     ED Disposition Condition Date/Time Comment    Discharge Stable Sat Aug 22, 2020 10:35 AM Vicky Denis Sr  discharge to home/self care              Follow-up Information     Follow up With Specialties Details Why Contact Info Additional Information    Walker Koehler DO Family Medicine Schedule an appointment as soon as possible for a visit  As needed [address]  [facility] [address] [facility] Schedule an appointment as soon as possible for a visit   [address] [facility] [address]  [address] [facility] Rachel Alcocer [address] [facility], [place], [place], [address] Patient's Medications   Discharge Prescriptions    No medications on file     No discharge procedures on file      PDMP Review     None          ED Provider  Electronically Signed by           Liang Arevalo PA-C  08/22/20 6468

## 2020-08-25 ENCOUNTER — TRANSCRIBE ORDERS (OUTPATIENT)
Dept: ADMINISTRATIVE | Facility: HOSPITAL | Age: 70
End: 2020-08-25

## 2020-08-25 DIAGNOSIS — M25.511 ACUTE PAIN OF RIGHT SHOULDER: Primary | ICD-10-CM

## 2020-08-31 ENCOUNTER — HOSPITAL ENCOUNTER (OUTPATIENT)
Dept: MRI IMAGING | Facility: HOSPITAL | Age: 70
Discharge: HOME | End: 2020-08-31
Payer: COMMERCIAL

## 2020-08-31 DIAGNOSIS — M25.511 ACUTE PAIN OF RIGHT SHOULDER: ICD-10-CM

## 2020-08-31 PROCEDURE — G1004 CDSM NDSC: HCPCS

## 2020-08-31 PROCEDURE — 73221 MRI JOINT UPR EXTREM W/O DYE: CPT

## 2020-10-09 ENCOUNTER — LAB (OUTPATIENT)
Dept: LAB | Facility: MEDICAL CENTER | Age: 70
End: 2020-10-09
Payer: COMMERCIAL

## 2020-10-09 ENCOUNTER — TRANSCRIBE ORDERS (OUTPATIENT)
Dept: LAB | Facility: MEDICAL CENTER | Age: 70
End: 2020-10-09

## 2020-10-09 DIAGNOSIS — I10 ESSENTIAL HYPERTENSION, MALIGNANT: ICD-10-CM

## 2020-10-09 DIAGNOSIS — E78.00 PURE HYPERCHOLESTEROLEMIA: ICD-10-CM

## 2020-10-09 DIAGNOSIS — E78.00 PURE HYPERCHOLESTEROLEMIA: Primary | ICD-10-CM

## 2020-10-09 LAB
ALBUMIN SERPL BCP-MCNC: 4 G/DL (ref 3.5–5)
ALP SERPL-CCNC: 95 U/L (ref 46–116)
ALT SERPL W P-5'-P-CCNC: 33 U/L (ref 12–78)
ANION GAP SERPL CALCULATED.3IONS-SCNC: 4 MMOL/L (ref 4–13)
AST SERPL W P-5'-P-CCNC: 20 U/L (ref 5–45)
BILIRUB SERPL-MCNC: 1.07 MG/DL (ref 0.2–1)
BUN SERPL-MCNC: 12 MG/DL (ref 5–25)
CALCIUM SERPL-MCNC: 10 MG/DL (ref 8.3–10.1)
CHLORIDE SERPL-SCNC: 106 MMOL/L (ref 100–108)
CHOLEST SERPL-MCNC: 152 MG/DL (ref 50–200)
CO2 SERPL-SCNC: 32 MMOL/L (ref 21–32)
CREAT SERPL-MCNC: 1 MG/DL (ref 0.6–1.3)
GFR SERPL CREATININE-BSD FRML MDRD: 76 ML/MIN/1.73SQ M
GLUCOSE P FAST SERPL-MCNC: 115 MG/DL (ref 65–99)
HDLC SERPL-MCNC: 39 MG/DL
LDLC SERPL CALC-MCNC: 84 MG/DL (ref 0–100)
NONHDLC SERPL-MCNC: 113 MG/DL
POTASSIUM SERPL-SCNC: 4 MMOL/L (ref 3.5–5.3)
PROT SERPL-MCNC: 7.6 G/DL (ref 6.4–8.2)
SODIUM SERPL-SCNC: 142 MMOL/L (ref 136–145)
TRIGL SERPL-MCNC: 144 MG/DL

## 2020-10-09 PROCEDURE — 80053 COMPREHEN METABOLIC PANEL: CPT

## 2020-10-09 PROCEDURE — 80061 LIPID PANEL: CPT

## 2020-10-09 PROCEDURE — 36415 COLL VENOUS BLD VENIPUNCTURE: CPT

## 2020-10-12 ENCOUNTER — TELEPHONE (OUTPATIENT)
Dept: FAMILY MEDICINE CLINIC | Facility: CLINIC | Age: 70
End: 2020-10-12

## 2020-11-09 ENCOUNTER — HOSPITAL ENCOUNTER (OUTPATIENT)
Dept: RADIOLOGY | Facility: HOSPITAL | Age: 70
Discharge: HOME | End: 2020-11-09
Payer: COMMERCIAL

## 2020-11-09 ENCOUNTER — LAB (OUTPATIENT)
Dept: LAB | Facility: HOSPITAL | Age: 70
End: 2020-11-09
Payer: COMMERCIAL

## 2020-11-09 ENCOUNTER — TRANSCRIBE ORDERS (OUTPATIENT)
Dept: ADMINISTRATIVE | Facility: HOSPITAL | Age: 70
End: 2020-11-09

## 2020-11-09 DIAGNOSIS — Z01.818 OTHER SPECIFIED PRE-OPERATIVE EXAMINATION: ICD-10-CM

## 2020-11-09 DIAGNOSIS — N21.0 CALCULUS IN DIVERTICULUM OF BLADDER: ICD-10-CM

## 2020-11-09 DIAGNOSIS — Z01.818 OTHER SPECIFIED PRE-OPERATIVE EXAMINATION: Primary | ICD-10-CM

## 2020-11-09 LAB
ANION GAP SERPL CALCULATED.3IONS-SCNC: 4 MMOL/L (ref 4–13)
APTT PPP: 25 SECONDS (ref 23–37)
BASOPHILS # BLD AUTO: 0.04 THOUSANDS/ΜL (ref 0–0.1)
BASOPHILS NFR BLD AUTO: 1 % (ref 0–1)
BILIRUB UR QL STRIP: NEGATIVE
BUN SERPL-MCNC: 17 MG/DL (ref 5–25)
CALCIUM SERPL-MCNC: 9.4 MG/DL (ref 8.3–10.1)
CHLORIDE SERPL-SCNC: 103 MMOL/L (ref 100–108)
CLARITY UR: NORMAL
CO2 SERPL-SCNC: 30 MMOL/L (ref 21–32)
COLOR UR: YELLOW
CREAT SERPL-MCNC: 1.21 MG/DL (ref 0.6–1.3)
EOSINOPHIL # BLD AUTO: 0.14 THOUSAND/ΜL (ref 0–0.61)
EOSINOPHIL NFR BLD AUTO: 2 % (ref 0–6)
ERYTHROCYTE [DISTWIDTH] IN BLOOD BY AUTOMATED COUNT: 12.8 % (ref 11.6–15.1)
GFR SERPL CREATININE-BSD FRML MDRD: 60 ML/MIN/1.73SQ M
GLUCOSE SERPL-MCNC: 137 MG/DL (ref 65–140)
GLUCOSE UR STRIP-MCNC: NEGATIVE MG/DL
HCT VFR BLD AUTO: 42.8 % (ref 36.5–49.3)
HGB BLD-MCNC: 14.1 G/DL (ref 12–17)
HGB UR QL STRIP.AUTO: NEGATIVE
IMM GRANULOCYTES # BLD AUTO: 0.01 THOUSAND/UL (ref 0–0.2)
IMM GRANULOCYTES NFR BLD AUTO: 0 % (ref 0–2)
INR PPP: 1.04 (ref 0.84–1.19)
KETONES UR STRIP-MCNC: NEGATIVE MG/DL
LEUKOCYTE ESTERASE UR QL STRIP: NEGATIVE
LYMPHOCYTES # BLD AUTO: 2.23 THOUSANDS/ΜL (ref 0.6–4.47)
LYMPHOCYTES NFR BLD AUTO: 31 % (ref 14–44)
MCH RBC QN AUTO: 31.8 PG (ref 26.8–34.3)
MCHC RBC AUTO-ENTMCNC: 32.9 G/DL (ref 31.4–37.4)
MCV RBC AUTO: 96 FL (ref 82–98)
MONOCYTES # BLD AUTO: 0.74 THOUSAND/ΜL (ref 0.17–1.22)
MONOCYTES NFR BLD AUTO: 10 % (ref 4–12)
NEUTROPHILS # BLD AUTO: 3.96 THOUSANDS/ΜL (ref 1.85–7.62)
NEUTS SEG NFR BLD AUTO: 56 % (ref 43–75)
NITRITE UR QL STRIP: NEGATIVE
NRBC BLD AUTO-RTO: 0 /100 WBCS
PH UR STRIP.AUTO: 5.5 [PH]
PLATELET # BLD AUTO: 276 THOUSANDS/UL (ref 149–390)
PMV BLD AUTO: 10.1 FL (ref 8.9–12.7)
POTASSIUM SERPL-SCNC: 4 MMOL/L (ref 3.5–5.3)
PROT UR STRIP-MCNC: NEGATIVE MG/DL
PROTHROMBIN TIME: 13.4 SECONDS (ref 11.6–14.5)
RBC # BLD AUTO: 4.44 MILLION/UL (ref 3.88–5.62)
SODIUM SERPL-SCNC: 137 MMOL/L (ref 136–145)
SP GR UR STRIP.AUTO: 1.02 (ref 1–1.03)
UROBILINOGEN UR QL STRIP.AUTO: 0.2 E.U./DL
WBC # BLD AUTO: 7.12 THOUSAND/UL (ref 4.31–10.16)

## 2020-11-09 PROCEDURE — 81003 URINALYSIS AUTO W/O SCOPE: CPT | Performed by: UROLOGY

## 2020-11-09 PROCEDURE — 85610 PROTHROMBIN TIME: CPT

## 2020-11-09 PROCEDURE — 87086 URINE CULTURE/COLONY COUNT: CPT

## 2020-11-09 PROCEDURE — 71046 X-RAY EXAM CHEST 2 VIEWS: CPT

## 2020-11-09 PROCEDURE — 85025 COMPLETE CBC W/AUTO DIFF WBC: CPT

## 2020-11-09 PROCEDURE — 80048 BASIC METABOLIC PNL TOTAL CA: CPT

## 2020-11-09 PROCEDURE — 36415 COLL VENOUS BLD VENIPUNCTURE: CPT

## 2020-11-09 PROCEDURE — 85730 THROMBOPLASTIN TIME PARTIAL: CPT

## 2020-11-10 LAB — BACTERIA UR CULT: NORMAL

## 2020-11-16 ENCOUNTER — CONSULT (OUTPATIENT)
Dept: FAMILY MEDICINE CLINIC | Facility: CLINIC | Age: 70
End: 2020-11-16
Payer: COMMERCIAL

## 2020-11-16 VITALS
DIASTOLIC BLOOD PRESSURE: 73 MMHG | BODY MASS INDEX: 32.58 KG/M2 | SYSTOLIC BLOOD PRESSURE: 126 MMHG | WEIGHT: 262 LBS | TEMPERATURE: 97.5 F | HEIGHT: 75 IN

## 2020-11-16 DIAGNOSIS — N20.0 RENAL CALCULI: ICD-10-CM

## 2020-11-16 DIAGNOSIS — I10 ESSENTIAL HYPERTENSION: ICD-10-CM

## 2020-11-16 DIAGNOSIS — I25.10 CORONARY ARTERY DISEASE INVOLVING NATIVE CORONARY ARTERY OF NATIVE HEART WITHOUT ANGINA PECTORIS: ICD-10-CM

## 2020-11-16 DIAGNOSIS — Z01.818 PREOP EXAMINATION: Primary | ICD-10-CM

## 2020-11-16 PROCEDURE — 3288F FALL RISK ASSESSMENT DOCD: CPT | Performed by: FAMILY MEDICINE

## 2020-11-16 PROCEDURE — 1100F PTFALLS ASSESS-DOCD GE2>/YR: CPT | Performed by: FAMILY MEDICINE

## 2020-11-16 PROCEDURE — 99214 OFFICE O/P EST MOD 30 MIN: CPT | Performed by: FAMILY MEDICINE

## 2020-11-16 PROCEDURE — 3725F SCREEN DEPRESSION PERFORMED: CPT | Performed by: FAMILY MEDICINE

## 2020-11-16 PROCEDURE — 3078F DIAST BP <80 MM HG: CPT | Performed by: FAMILY MEDICINE

## 2020-11-16 PROCEDURE — 3074F SYST BP LT 130 MM HG: CPT | Performed by: FAMILY MEDICINE

## 2020-11-16 PROCEDURE — 3008F BODY MASS INDEX DOCD: CPT | Performed by: FAMILY MEDICINE

## 2020-11-16 PROCEDURE — 1036F TOBACCO NON-USER: CPT | Performed by: FAMILY MEDICINE

## 2020-11-16 RX ORDER — RANIBIZUMAB 10 MG/ML
0.5 INJECTION, SOLUTION INTRAVITREAL
COMMUNITY

## 2020-11-16 RX ORDER — BRIMONIDINE TARTRATE/TIMOLOL 0.2%-0.5%
1 DROPS OPHTHALMIC (EYE) EVERY 12 HOURS SCHEDULED
COMMUNITY

## 2021-03-09 DIAGNOSIS — Z23 ENCOUNTER FOR IMMUNIZATION: ICD-10-CM

## 2021-03-10 ENCOUNTER — IMMUNIZATIONS (OUTPATIENT)
Dept: FAMILY MEDICINE CLINIC | Facility: HOSPITAL | Age: 71
End: 2021-03-10

## 2021-03-10 DIAGNOSIS — Z23 ENCOUNTER FOR IMMUNIZATION: Primary | ICD-10-CM

## 2021-03-10 PROCEDURE — 0011A SARS-COV-2 / COVID-19 MRNA VACCINE (MODERNA) 100 MCG: CPT

## 2021-03-10 PROCEDURE — 91301 SARS-COV-2 / COVID-19 MRNA VACCINE (MODERNA) 100 MCG: CPT

## 2021-04-07 ENCOUNTER — IMMUNIZATIONS (OUTPATIENT)
Dept: FAMILY MEDICINE CLINIC | Facility: HOSPITAL | Age: 71
End: 2021-04-07

## 2021-04-07 DIAGNOSIS — Z23 ENCOUNTER FOR IMMUNIZATION: Primary | ICD-10-CM

## 2021-04-07 PROCEDURE — 0012A SARS-COV-2 / COVID-19 MRNA VACCINE (MODERNA) 100 MCG: CPT

## 2021-04-07 PROCEDURE — 91301 SARS-COV-2 / COVID-19 MRNA VACCINE (MODERNA) 100 MCG: CPT

## 2021-04-23 ENCOUNTER — OFFICE VISIT (OUTPATIENT)
Dept: OTOLARYNGOLOGY | Facility: CLINIC | Age: 71
End: 2021-04-23
Payer: COMMERCIAL

## 2021-04-23 VITALS
WEIGHT: 265 LBS | TEMPERATURE: 97.3 F | DIASTOLIC BLOOD PRESSURE: 70 MMHG | SYSTOLIC BLOOD PRESSURE: 130 MMHG | OXYGEN SATURATION: 96 % | HEART RATE: 83 BPM | HEIGHT: 75 IN | BODY MASS INDEX: 32.95 KG/M2

## 2021-04-23 DIAGNOSIS — M26.629 TMJ SYNDROME: Primary | ICD-10-CM

## 2021-04-23 PROCEDURE — 99203 OFFICE O/P NEW LOW 30 MIN: CPT | Performed by: OTOLARYNGOLOGY

## 2021-04-23 PROCEDURE — 3008F BODY MASS INDEX DOCD: CPT | Performed by: OTOLARYNGOLOGY

## 2021-04-23 PROCEDURE — 1036F TOBACCO NON-USER: CPT | Performed by: OTOLARYNGOLOGY

## 2021-04-23 PROCEDURE — 1160F RVW MEDS BY RX/DR IN RCRD: CPT | Performed by: OTOLARYNGOLOGY

## 2021-04-25 NOTE — PROGRESS NOTES
Consultation - Otolaryngology - Head and Neck Surgery  Facial Plastic and Reconstructive Surgery  Griselda Freud   79 y o  male MRN: [id number]  Encounter: [id number]        Assessment/Plan:  1  TMJ syndrome         Temporomandibular joint syndrome: We discussed soft diet for 2 weeks, appropriate use of NSAIDs for 1 week, warm compresses, massage and PT for TMJ therapy  Will refer to PT for TMJ and follow up PRN  History of Present Illness   Physician Requesting Consult: Nani Marcos DO   Reason for Consult / Principal Problem: bilateral ear pain  HPI: Robb Elizabeth  is a 79y o  year old male who presents with History of bilateral ear pain  He denies any ear drainage or hearing change  No previous ear surgery  No family history of hearing or ear problems  No previous audiogram   He is concerned that he may have cerumen impaction as a cause for ear pain    Review of systems:  10 Point ROS was performed and negative except as above or otherwise noted in the medical record  Historical Information   Past Medical History:   Diagnosis Date    CAD (coronary artery disease)      Past Surgical History:   Procedure Laterality Date    ADENOIDECTOMY      CARPAL TUNNEL RELEASE Bilateral     CORONARY ARTERY BYPASS GRAFT  05/2001    x4    NASAL SEPTUM SURGERY      TONSILLECTOMY       Social History   Social History     Substance and Sexual Activity   Alcohol Use Yes    Frequency: 2-4 times a month     Social History     Substance and Sexual Activity   Drug Use Never     Social History     Tobacco Use   Smoking Status Never Smoker   Smokeless Tobacco Never Used     Family History: History reviewed  No pertinent family history      Current Outpatient Medications on File Prior to Visit   Medication Sig    aspirin 81 MG tablet Take by mouth every other day Three times weekly    bimatoprost (LUMIGAN) 0 01 % ophthalmic drops Administer 1 drop to the right eye daily at bedtime    brimonidine-timolol (Combigan) 0 2-0 5 % Administer 1 drop to the right eye every 12 (twelve) hours    carvedilol (COREG CR) 10 MG 24 hr capsule Take 1 capsule (10 mg total) by mouth 2 (two) times a day    Docosahexaenoic Acid (DHA OMEGA 3) 100 MG CAPS Take by mouth daily    ezetimibe (ZETIA) 10 mg tablet Take 1 tablet (10 mg total) by mouth daily    folic acid (FOLVITE) 730 mcg tablet Take 1 tablet by mouth daily    Multiple Vitamin (DAILY VALUE MULTIVITAMIN PO) Take 1 tablet by mouth daily    Multiple Vitamins-Minerals (PRESERVISION AREDS 2 PO) Take 1 capsule by mouth 2 (two) times a day    olmesartan (BENICAR) 40 mg tablet Take 1 tablet (40 mg total) by mouth daily    ranibizumab (Lucentis) 0 5 mg/0 05mL Administer 0 5 mg to both eyes every 30 (thirty) days    rosuvastatin (CRESTOR) 20 MG tablet Take 1 tablet (20 mg total) by mouth daily     No current facility-administered medications on file prior to visit  No Known Allergies    Vitals:    04/23/21 1440   BP: 130/70   Pulse: 83   Temp: (!) 97 3 °F (36 3 °C)   SpO2: 96%       Physical Exam   Constitutional: Oriented to person, place, and time  Well-developed and well-nourished, no apparent distress, non-toxic appearance  Cooperative, able to hear and answer questions without difficulty  Voice: Normal voice quality  Head: Normocephalic, atraumatic  No scars, masses or lesions  Face: Symmetric, no edema, no sinus tenderness  Eyes: Vision grossly intact, extra-ocular movement intact  Ears: External ears normal  Tympanic membranes intact with intact normal landmarks  No post-auricular erythema or tenderness  Nose: Septum intact, nares clear  Mucosa moist, turbinates well appearing  No crusting, polyps or discharge evident  Oral cavity: Dentition intact  Mucosa moist, lips without lesions or masses  Tongue mobile, floor of mouth soft and flat  Hard palate intact  No masses or lesions     Oropharynx: Uvula is midline, soft palate intact without lesion or mass   Oropharyngeal inlet without obstruction  Tonsils unremarkable  Posterior pharyngeal wall clear  No masses or lesions  Salivary glands:  Parotid glands and submandibular glands symmetric, no enlargement or tenderness  Neck: Normal laryngeal elevation with swallow  Trachea midline  No masses or lesions  No palpable adenopathy  Thyroid: Without tenderness or palpable nodules  Pulmonary/Chest: Normal effort and rate  No respiratory distress  No stertor or stridor  Musculoskeletal: Normal range of motion  Neurological: Cranial nerves 2-12 intact  Skin: Skin is warm and dry  Psychiatric: Normal mood and affect  No cerumen  Discussed nature of otalgia in a normal appearing ear  Imaging Studies: I have personally reviewed pertinent reports  Lab Results: I have personally reviewed pertinent lab results  Records reviewed: Dr Fox Section notes reviewed

## 2021-09-02 ENCOUNTER — TELEPHONE (OUTPATIENT)
Dept: FAMILY MEDICINE CLINIC | Facility: CLINIC | Age: 71
End: 2021-09-02

## 2021-09-02 DIAGNOSIS — M54.59 MECHANICAL LOW BACK PAIN: Primary | ICD-10-CM

## 2021-09-02 NOTE — TELEPHONE ENCOUNTER
Twisted back wrong and having some pain, would like to do some PT - [address]  [facility] and [facility], possibly get US treatments for it  Right side lower back  Please advise

## 2021-10-08 ENCOUNTER — APPOINTMENT (OUTPATIENT)
Dept: LAB | Facility: MEDICAL CENTER | Age: 71
End: 2021-10-08
Payer: COMMERCIAL

## 2021-10-08 DIAGNOSIS — E78.00 PURE HYPERCHOLESTEROLEMIA: ICD-10-CM

## 2021-10-08 DIAGNOSIS — I10 ESSENTIAL HYPERTENSION, MALIGNANT: ICD-10-CM

## 2021-10-08 DIAGNOSIS — I25.10 ATHEROSCLEROSIS OF NATIVE CORONARY ARTERY, UNSPECIFIED WHETHER ANGINA PRESENT, UNSPECIFIED WHETHER NATIVE OR TRANSPLANTED HEART: ICD-10-CM

## 2021-10-08 LAB
ALBUMIN SERPL BCP-MCNC: 3.5 G/DL (ref 3.5–5)
ALP SERPL-CCNC: 83 U/L (ref 46–116)
ALT SERPL W P-5'-P-CCNC: 31 U/L (ref 12–78)
ANION GAP SERPL CALCULATED.3IONS-SCNC: 3 MMOL/L (ref 4–13)
AST SERPL W P-5'-P-CCNC: 19 U/L (ref 5–45)
BILIRUB SERPL-MCNC: 0.83 MG/DL (ref 0.2–1)
BUN SERPL-MCNC: 15 MG/DL (ref 5–25)
CALCIUM SERPL-MCNC: 9.7 MG/DL (ref 8.3–10.1)
CHLORIDE SERPL-SCNC: 107 MMOL/L (ref 100–108)
CHOLEST SERPL-MCNC: 151 MG/DL (ref 50–200)
CO2 SERPL-SCNC: 31 MMOL/L (ref 21–32)
CREAT SERPL-MCNC: 1.08 MG/DL (ref 0.6–1.3)
ERYTHROCYTE [DISTWIDTH] IN BLOOD BY AUTOMATED COUNT: 13 % (ref 11.6–15.1)
GFR SERPL CREATININE-BSD FRML MDRD: 69 ML/MIN/1.73SQ M
GLUCOSE P FAST SERPL-MCNC: 121 MG/DL (ref 65–99)
HCT VFR BLD AUTO: 43.1 % (ref 36.5–49.3)
HDLC SERPL-MCNC: 44 MG/DL
HGB BLD-MCNC: 14.1 G/DL (ref 12–17)
LDLC SERPL CALC-MCNC: 79 MG/DL (ref 0–100)
MCH RBC QN AUTO: 32 PG (ref 26.8–34.3)
MCHC RBC AUTO-ENTMCNC: 32.7 G/DL (ref 31.4–37.4)
MCV RBC AUTO: 98 FL (ref 82–98)
NONHDLC SERPL-MCNC: 107 MG/DL
PLATELET # BLD AUTO: 296 THOUSANDS/UL (ref 149–390)
PMV BLD AUTO: 10 FL (ref 8.9–12.7)
POTASSIUM SERPL-SCNC: 4.1 MMOL/L (ref 3.5–5.3)
PROT SERPL-MCNC: 7 G/DL (ref 6.4–8.2)
RBC # BLD AUTO: 4.41 MILLION/UL (ref 3.88–5.62)
SODIUM SERPL-SCNC: 141 MMOL/L (ref 136–145)
TRIGL SERPL-MCNC: 138 MG/DL
WBC # BLD AUTO: 8 THOUSAND/UL (ref 4.31–10.16)

## 2021-10-08 PROCEDURE — 36415 COLL VENOUS BLD VENIPUNCTURE: CPT

## 2021-10-08 PROCEDURE — 80053 COMPREHEN METABOLIC PANEL: CPT

## 2021-10-08 PROCEDURE — 80061 LIPID PANEL: CPT

## 2021-10-08 PROCEDURE — 85027 COMPLETE CBC AUTOMATED: CPT

## 2021-11-03 ENCOUNTER — IMMUNIZATIONS (OUTPATIENT)
Dept: FAMILY MEDICINE CLINIC | Facility: HOSPITAL | Age: 71
End: 2021-11-03

## 2021-11-03 DIAGNOSIS — Z23 ENCOUNTER FOR IMMUNIZATION: Primary | ICD-10-CM

## 2021-11-03 PROCEDURE — 0011A COVID-19 MODERNA VACC 0.5 ML: CPT

## 2021-11-03 PROCEDURE — 91301 COVID-19 MODERNA VACC 0.5 ML: CPT

## 2022-05-11 ENCOUNTER — OFFICE VISIT (OUTPATIENT)
Dept: FAMILY MEDICINE CLINIC | Facility: CLINIC | Age: 72
End: 2022-05-11
Payer: COMMERCIAL

## 2022-05-11 VITALS
HEART RATE: 81 BPM | BODY MASS INDEX: 34.59 KG/M2 | SYSTOLIC BLOOD PRESSURE: 140 MMHG | TEMPERATURE: 97 F | HEIGHT: 73 IN | DIASTOLIC BLOOD PRESSURE: 88 MMHG | WEIGHT: 261 LBS | OXYGEN SATURATION: 95 %

## 2022-05-11 DIAGNOSIS — I10 PRIMARY HYPERTENSION: Primary | ICD-10-CM

## 2022-05-11 DIAGNOSIS — H35.30 ARMD (AGE RELATED MACULAR DEGENERATION): ICD-10-CM

## 2022-05-11 DIAGNOSIS — E78.00 HYPERCHOLESTEREMIA: ICD-10-CM

## 2022-05-11 PROCEDURE — 1101F PT FALLS ASSESS-DOCD LE1/YR: CPT | Performed by: FAMILY MEDICINE

## 2022-05-11 PROCEDURE — 99214 OFFICE O/P EST MOD 30 MIN: CPT | Performed by: FAMILY MEDICINE

## 2022-05-11 PROCEDURE — 1160F RVW MEDS BY RX/DR IN RCRD: CPT | Performed by: FAMILY MEDICINE

## 2022-05-11 PROCEDURE — 3725F SCREEN DEPRESSION PERFORMED: CPT | Performed by: FAMILY MEDICINE

## 2022-05-11 PROCEDURE — 3288F FALL RISK ASSESSMENT DOCD: CPT | Performed by: FAMILY MEDICINE

## 2022-05-11 PROCEDURE — 1036F TOBACCO NON-USER: CPT | Performed by: FAMILY MEDICINE

## 2022-05-11 PROCEDURE — 3008F BODY MASS INDEX DOCD: CPT | Performed by: FAMILY MEDICINE

## 2022-05-11 NOTE — PROGRESS NOTES
BMI Counseling: Body mass index is 34 43 kg/m²  The BMI is above normal  Nutrition recommendations include decreasing portion sizes, encouraging healthy choices of fruits and vegetables, moderation in carbohydrate intake and increasing intake of lean protein  Exercise recommendations include moderate physical activity 150 minutes/week and exercising 3-5 times per week  Rationale for BMI follow-up plan is due to patient being overweight or obese  Depression Screening and Follow-up Plan: Patient was screened for depression during today's encounter  They screened negative with a PHQ-2 score of 0  Assessment/Plan:    Problem List Items Addressed This Visit        Cardiovascular and Mediastinum    Hypertension - Primary       Other    ARMD (age related macular degeneration)    Hypercholesteremia           Diagnoses and all orders for this visit:    Primary hypertension    ARMD (age related macular degeneration)    Hypercholesteremia        No problem-specific Assessment & Plan notes found for this encounter  Subjective:      Patient ID: Emile Mehta is a 70 y o  male  Pedal edema  Venous insufficiency       The following portions of the patient's history were reviewed and updated as appropriate:   He has a past medical history of CAD (coronary artery disease)  ,  does not have any pertinent problems on file  ,   has a past surgical history that includes Coronary artery bypass graft (05/2001); Tonsillectomy; Adenoidectomy; Nasal septum surgery; and Carpal tunnel release (Bilateral)  ,  family history is not on file  ,   reports that he has never smoked  He has never used smokeless tobacco  He reports current alcohol use  He reports that he does not use drugs  ,  has No Known Allergies     Current Outpatient Medications   Medication Sig Dispense Refill    aspirin 81 MG tablet Take by mouth every other day Three times weekly      bimatoprost (LUMIGAN) 0 01 % ophthalmic drops Administer 1 drop to the right eye daily at bedtime      brimonidine-timolol (Combigan) 0 2-0 5 % Administer 1 drop to the right eye every 12 (twelve) hours      carvedilol (COREG CR) 10 MG 24 hr capsule Take 1 capsule (10 mg total) by mouth 2 (two) times a day 180 capsule 2    Docosahexaenoic Acid (DHA OMEGA 3) 100 MG CAPS Take by mouth daily      ezetimibe (ZETIA) 10 mg tablet Take 1 tablet (10 mg total) by mouth daily 90 tablet 3    folic acid (FOLVITE) 571 mcg tablet Take 1 tablet by mouth daily      Multiple Vitamin (DAILY VALUE MULTIVITAMIN PO) Take 1 tablet by mouth daily      Multiple Vitamins-Minerals (PRESERVISION AREDS 2 PO) Take 1 capsule by mouth 2 (two) times a day      olmesartan (BENICAR) 40 mg tablet Take 1 tablet (40 mg total) by mouth daily 90 tablet 2    ranibizumab (Lucentis) 0 5 mg/0 05mL Administer 0 5 mg to both eyes every 30 (thirty) days      rosuvastatin (CRESTOR) 20 MG tablet Take 1 tablet (20 mg total) by mouth daily 90 tablet 2     No current facility-administered medications for this visit  Review of Systems      Objective:  Vitals:    05/11/22 1539   BP: 140/88   BP Location: Left arm   Patient Position: Sitting   Cuff Size: Large   Pulse: 81   Temp: (!) 97 °F (36 1 °C)   TempSrc: Temporal   SpO2: 95%   Weight: 118 kg (261 lb)   Height: 6' 1" (1 854 m)     Body mass index is 34 43 kg/m²  Physical Exam  Constitutional:       General: He is not in acute distress  Appearance: He is well-developed  He is not diaphoretic  HENT:      Head: Normocephalic  Right Ear: External ear normal       Left Ear: External ear normal       Nose: Nose normal    Eyes:      General: No scleral icterus  Right eye: No discharge  Left eye: No discharge  Conjunctiva/sclera: Conjunctivae normal       Pupils: Pupils are equal, round, and reactive to light  Neck:      Thyroid: No thyromegaly  Trachea: No tracheal deviation     Cardiovascular:      Rate and Rhythm: Normal rate and regular rhythm  Heart sounds: Normal heart sounds  No murmur heard  No friction rub  No gallop  Pulmonary:      Effort: Pulmonary effort is normal  No respiratory distress  Breath sounds: Normal breath sounds  No wheezing  Abdominal:      General: Bowel sounds are normal       Palpations: Abdomen is soft  There is no mass  Tenderness: There is no abdominal tenderness  There is no guarding  Musculoskeletal:         General: No deformity  Cervical back: Normal range of motion  Lymphadenopathy:      Cervical: No cervical adenopathy  Skin:     General: Skin is warm and dry  Findings: No erythema or rash  Neurological:      Mental Status: He is alert and oriented to person, place, and time  Cranial Nerves: No cranial nerve deficit  Psychiatric:         Thought Content:  Thought content normal

## 2022-06-21 ENCOUNTER — APPOINTMENT (OUTPATIENT)
Dept: LAB | Facility: HOSPITAL | Age: 72
End: 2022-06-21
Attending: ORTHOPAEDIC SURGERY
Payer: COMMERCIAL

## 2022-06-21 ENCOUNTER — HOSPITAL ENCOUNTER (OUTPATIENT)
Dept: NON INVASIVE DIAGNOSTICS | Facility: HOSPITAL | Age: 72
Discharge: HOME | End: 2022-06-21
Attending: ORTHOPAEDIC SURGERY
Payer: COMMERCIAL

## 2022-06-21 DIAGNOSIS — Z01.818 PREOPERATIVE EXAMINATION, UNSPECIFIED: ICD-10-CM

## 2022-06-21 LAB
ALBUMIN SERPL BCP-MCNC: 3.7 G/DL (ref 3.5–5)
ALP SERPL-CCNC: 90 U/L (ref 46–116)
ALT SERPL W P-5'-P-CCNC: 39 U/L (ref 12–78)
ANION GAP SERPL CALCULATED.3IONS-SCNC: 5 MMOL/L (ref 4–13)
AST SERPL W P-5'-P-CCNC: 25 U/L (ref 5–45)
ATRIAL RATE: 64 BPM
BASOPHILS # BLD AUTO: 0.05 THOUSANDS/ΜL (ref 0–0.1)
BASOPHILS NFR BLD AUTO: 1 % (ref 0–1)
BILIRUB SERPL-MCNC: 0.97 MG/DL (ref 0.2–1)
BUN SERPL-MCNC: 17 MG/DL (ref 5–25)
CALCIUM SERPL-MCNC: 9.4 MG/DL (ref 8.3–10.1)
CHLORIDE SERPL-SCNC: 103 MMOL/L (ref 100–108)
CO2 SERPL-SCNC: 31 MMOL/L (ref 21–32)
CREAT SERPL-MCNC: 1.13 MG/DL (ref 0.6–1.3)
EOSINOPHIL # BLD AUTO: 0.13 THOUSAND/ΜL (ref 0–0.61)
EOSINOPHIL NFR BLD AUTO: 2 % (ref 0–6)
ERYTHROCYTE [DISTWIDTH] IN BLOOD BY AUTOMATED COUNT: 12.7 % (ref 11.6–15.1)
GFR SERPL CREATININE-BSD FRML MDRD: 65 ML/MIN/1.73SQ M
GLUCOSE SERPL-MCNC: 127 MG/DL (ref 65–140)
HCT VFR BLD AUTO: 42.2 % (ref 36.5–49.3)
HGB BLD-MCNC: 13.8 G/DL (ref 12–17)
IMM GRANULOCYTES # BLD AUTO: 0.02 THOUSAND/UL (ref 0–0.2)
IMM GRANULOCYTES NFR BLD AUTO: 0 % (ref 0–2)
LYMPHOCYTES # BLD AUTO: 2.2 THOUSANDS/ΜL (ref 0.6–4.47)
LYMPHOCYTES NFR BLD AUTO: 32 % (ref 14–44)
MCH RBC QN AUTO: 31 PG (ref 26.8–34.3)
MCHC RBC AUTO-ENTMCNC: 32.7 G/DL (ref 31.4–37.4)
MCV RBC AUTO: 95 FL (ref 82–98)
MONOCYTES # BLD AUTO: 0.73 THOUSAND/ΜL (ref 0.17–1.22)
MONOCYTES NFR BLD AUTO: 11 % (ref 4–12)
NEUTROPHILS # BLD AUTO: 3.82 THOUSANDS/ΜL (ref 1.85–7.62)
NEUTS SEG NFR BLD AUTO: 54 % (ref 43–75)
NRBC BLD AUTO-RTO: 0 /100 WBCS
P AXIS: 67 DEGREES
PLATELET # BLD AUTO: 304 THOUSANDS/UL (ref 149–390)
PMV BLD AUTO: 10 FL (ref 8.9–12.7)
POTASSIUM SERPL-SCNC: 4.1 MMOL/L (ref 3.5–5.3)
PR INTERVAL: 156 MS
PROT SERPL-MCNC: 7.7 G/DL (ref 6.4–8.2)
QRS AXIS: 81 DEGREES
QRSD INTERVAL: 100 MS
QT INTERVAL: 430 MS
QTC INTERVAL: 443 MS
RBC # BLD AUTO: 4.45 MILLION/UL (ref 3.88–5.62)
SODIUM SERPL-SCNC: 139 MMOL/L (ref 136–145)
T WAVE AXIS: 99 DEGREES
VENTRICULAR RATE: 64 BPM
WBC # BLD AUTO: 6.95 THOUSAND/UL (ref 4.31–10.16)

## 2022-06-21 PROCEDURE — 36415 COLL VENOUS BLD VENIPUNCTURE: CPT

## 2022-06-21 PROCEDURE — 85025 COMPLETE CBC W/AUTO DIFF WBC: CPT

## 2022-06-21 PROCEDURE — 93005 ELECTROCARDIOGRAM TRACING: CPT

## 2022-06-21 PROCEDURE — 93010 ELECTROCARDIOGRAM REPORT: CPT | Performed by: INTERNAL MEDICINE

## 2022-06-21 PROCEDURE — 80053 COMPREHEN METABOLIC PANEL: CPT

## 2022-06-22 ENCOUNTER — RA CDI HCC (OUTPATIENT)
Dept: OTHER | Facility: HOSPITAL | Age: 72
End: 2022-06-22

## 2022-06-22 NOTE — PROGRESS NOTES
[facility]  coding opportunities       Chart reviewed, no opportunity found: CHART REVIEWED, NO OPPORTUNITY FOUND        Patients Insurance        Commercial Insurance: [employer]

## 2022-06-29 ENCOUNTER — CONSULT (OUTPATIENT)
Dept: FAMILY MEDICINE CLINIC | Facility: CLINIC | Age: 72
End: 2022-06-29
Payer: COMMERCIAL

## 2022-06-29 VITALS
BODY MASS INDEX: 34.19 KG/M2 | OXYGEN SATURATION: 98 % | SYSTOLIC BLOOD PRESSURE: 132 MMHG | DIASTOLIC BLOOD PRESSURE: 78 MMHG | WEIGHT: 258 LBS | TEMPERATURE: 97.8 F | HEART RATE: 83 BPM | RESPIRATION RATE: 18 BRPM | HEIGHT: 73 IN

## 2022-06-29 DIAGNOSIS — I10 PRIMARY HYPERTENSION: ICD-10-CM

## 2022-06-29 DIAGNOSIS — M17.12 PRIMARY OSTEOARTHRITIS OF LEFT KNEE: ICD-10-CM

## 2022-06-29 DIAGNOSIS — Z01.818 PREOP EXAMINATION: Primary | ICD-10-CM

## 2022-06-29 DIAGNOSIS — I25.10 CORONARY ARTERY DISEASE INVOLVING NATIVE CORONARY ARTERY OF NATIVE HEART WITHOUT ANGINA PECTORIS: ICD-10-CM

## 2022-06-29 DIAGNOSIS — Z95.1 HX OF CABG: ICD-10-CM

## 2022-06-29 DIAGNOSIS — H35.30 ARMD (AGE RELATED MACULAR DEGENERATION): ICD-10-CM

## 2022-06-29 PROCEDURE — 99214 OFFICE O/P EST MOD 30 MIN: CPT | Performed by: FAMILY MEDICINE

## 2022-06-29 PROCEDURE — 1036F TOBACCO NON-USER: CPT | Performed by: FAMILY MEDICINE

## 2022-06-29 PROCEDURE — 3008F BODY MASS INDEX DOCD: CPT | Performed by: FAMILY MEDICINE

## 2022-06-29 RX ORDER — CHLORHEXIDINE GLUCONATE 213 G/1000ML
SOLUTION TOPICAL
COMMUNITY
Start: 2022-06-16

## 2022-06-29 RX ORDER — CELECOXIB 200 MG/1
CAPSULE ORAL
COMMUNITY
Start: 2022-06-17

## 2022-06-29 RX ORDER — IMIQUIMOD 12.5 MG/.25G
CREAM TOPICAL
COMMUNITY
Start: 2022-06-01

## 2022-06-29 NOTE — PROGRESS NOTES
Subjective:     Tali Andrew is a 70 y o  male who presents to the office today for a preoperative consultation at the request of surgeon Yonathan Oliva MD who plans on performing left total knee arthroplasty on July 12  This consultation is requested for the specific conditions prompting preoperative evaluation (i e  because of potential affect on operative risk):  Coronary artery disease, hypertension,  obesity, elevated blood glucose  Planned anesthesia: spinal  The patient has the following known anesthesia issues:  no prior problems with endotracheal intubation or anesthesia  Patients bleeding risk: no recent abnormal bleeding  The following portions of the patient's history were reviewed and updated as appropriate:   He  has a past medical history of CAD (coronary artery disease)  He   Patient Active Problem List    Diagnosis Date Noted    Hx of CABG in 2001 09/24/2018    Hypercholesteremia 06/07/2016    ARMD (age related macular degeneration) 11/10/2014    CAD (coronary artery disease) 08/28/2013    Hypertension 08/12/2013     He  has a past surgical history that includes Coronary artery bypass graft (05/2001); Tonsillectomy; Adenoidectomy; Nasal septum surgery; Carpal tunnel release (Bilateral); and Lithotripsy (10/28/2020)  His family history includes Abdominal aortic aneurysm in his father; Lung cancer in his mother  He  reports that he has never smoked  He has never used smokeless tobacco  He reports current alcohol use  He reports that he does not use drugs    Current Outpatient Medications   Medication Sig Dispense Refill    aspirin 81 MG tablet Take by mouth every other day Three times weekly      brimonidine-timolol (Combigan) 0 2-0 5 % Administer 1 drop to the right eye every 12 (twelve) hours      carvedilol (COREG CR) 10 MG 24 hr capsule Take 1 capsule (10 mg total) by mouth 2 (two) times a day 180 capsule 2    Docosahexaenoic Acid (DHA OMEGA 3 PO) Take by mouth daily 1000 mg daily      ezetimibe (ZETIA) 10 mg tablet Take 1 tablet (10 mg total) by mouth daily 90 tablet 3    folic acid (FOLVITE) 013 mcg tablet Take 1 tablet by mouth daily      Multiple Vitamin (DAILY VALUE MULTIVITAMIN PO) Take 1 tablet by mouth daily      Multiple Vitamins-Minerals (PRESERVISION AREDS 2 PO) Take 1 capsule by mouth 2 (two) times a day      olmesartan (BENICAR) 40 mg tablet Take 1 tablet (40 mg total) by mouth daily 90 tablet 2    ranibizumab (Lucentis) 0 5 mg/0 05mL Administer 0 5 mg to both eyes every 30 (thirty) days      rosuvastatin (CRESTOR) 20 MG tablet Take 1 tablet (20 mg total) by mouth daily 90 tablet 2    bimatoprost (LUMIGAN) 0 01 % ophthalmic drops Administer 1 drop to the right eye daily at bedtime      celecoxib (CeleBREX) 200 mg capsule take 1 capsule by mouth daily for 3 days PRIOR TO SURGERY DO NOT      (REFER TO PRESCRIPTION NOTES)  (Patient not taking: Reported on 6/29/2022)      chlorhexidine (Hibiclens) 4 % external liquid Hibiclens 4 % topical liquid   Wash surgical site daily starting five days before surgery (Patient not taking: Reported on 6/29/2022)      imiquimod (ALDARA) 5 % cream APPLY TO FACE MONDAY, WEDNESDAY, AND FRIDAY AFTER SUPPER FOR 4 WEEKS, WASH OFF IN THE MORNING (Patient not taking: Reported on 6/29/2022)      mupirocin (BACTROBAN) 2 % ointment APPLY A SMALL AMOUNT TO BOTH NOSTRILS TWICE DAILY FOR 5 DAYS PRIOR TO SURGERY (Patient not taking: Reported on 6/29/2022)       No current facility-administered medications for this visit       Current Outpatient Medications on File Prior to Visit   Medication Sig    aspirin 81 MG tablet Take by mouth every other day Three times weekly    brimonidine-timolol (Combigan) 0 2-0 5 % Administer 1 drop to the right eye every 12 (twelve) hours    carvedilol (COREG CR) 10 MG 24 hr capsule Take 1 capsule (10 mg total) by mouth 2 (two) times a day    Docosahexaenoic Acid (DHA OMEGA 3 PO) Take by mouth daily 1000 mg daily  ezetimibe (ZETIA) 10 mg tablet Take 1 tablet (10 mg total) by mouth daily    folic acid (FOLVITE) 095 mcg tablet Take 1 tablet by mouth daily    Multiple Vitamin (DAILY VALUE MULTIVITAMIN PO) Take 1 tablet by mouth daily    Multiple Vitamins-Minerals (PRESERVISION AREDS 2 PO) Take 1 capsule by mouth 2 (two) times a day    olmesartan (BENICAR) 40 mg tablet Take 1 tablet (40 mg total) by mouth daily    ranibizumab (Lucentis) 0 5 mg/0 05mL Administer 0 5 mg to both eyes every 30 (thirty) days    rosuvastatin (CRESTOR) 20 MG tablet Take 1 tablet (20 mg total) by mouth daily    bimatoprost (LUMIGAN) 0 01 % ophthalmic drops Administer 1 drop to the right eye daily at bedtime    celecoxib (CeleBREX) 200 mg capsule take 1 capsule by mouth daily for 3 days PRIOR TO SURGERY DO NOT      (REFER TO PRESCRIPTION NOTES)  (Patient not taking: Reported on 6/29/2022)    chlorhexidine (Hibiclens) 4 % external liquid Hibiclens 4 % topical liquid   Wash surgical site daily starting five days before surgery (Patient not taking: Reported on 6/29/2022)    imiquimod (ALDARA) 5 % cream APPLY TO FACE MONDAY, WEDNESDAY, AND FRIDAY AFTER SUPPER FOR 4 WEEKS, WASH OFF IN THE MORNING (Patient not taking: Reported on 6/29/2022)    mupirocin (BACTROBAN) 2 % ointment APPLY A SMALL AMOUNT TO BOTH NOSTRILS TWICE DAILY FOR 5 DAYS PRIOR TO SURGERY (Patient not taking: Reported on 6/29/2022)     No current facility-administered medications on file prior to visit  He has No Known Allergies     Past Medical History:   Diagnosis Date    CAD (coronary artery disease)        Past Surgical History:   Procedure Laterality Date    ADENOIDECTOMY      CARPAL TUNNEL RELEASE Bilateral     CORONARY ARTERY BYPASS GRAFT  05/2001    x4    LITHOTRIPSY  10/28/2020    Dr Tania Dyer         Review of Systems  A comprehensive review of systems was negative except for: Eyes: positive for Macular degeneration  Cardiovascular: positive for Coronary artery disease status post CABG 2001  Musculoskeletal: positive for  joint pain, joint swelling and joint stiffness  Endocrine: positive for Elevated blood glucose diet controlled     Objective:  Physical Exam    Cardiographics  ECG: Patient had a preoperative cardiac clearance by Dr Pamella Neal  Echocardiogram: not done    Imaging  Chest x-ray: normal     Lab Review   Hospital Outpatient Visit on 06/21/2022   Component Date Value    Ventricular Rate 06/21/2022 64     Atrial Rate 06/21/2022 64     FL Interval 06/21/2022 156     QRSD Interval 06/21/2022 100     QT Interval 06/21/2022 430     QTC Interval 06/21/2022 443     P Axis 06/21/2022 67     QRS Axis 06/21/2022 81     T Wave [place] 06/21/2022 99    Appointment on 06/21/2022   Component Date Value    WBC 06/21/2022 6 95     RBC 06/21/2022 4 45     Hemoglobin 06/21/2022 13 8     Hematocrit 06/21/2022 42 2     MCV 06/21/2022 95     MCH 06/21/2022 31 0     MCHC 06/21/2022 32 7     RDW 06/21/2022 12 7     MPV 06/21/2022 10 0     Platelets 52/94/6839 304     nRBC 06/21/2022 0     Neutrophils Relative 06/21/2022 54     Immat GRANS % 06/21/2022 0     Lymphocytes Relative 06/21/2022 32     Monocytes Relative 06/21/2022 11     Eosinophils Relative 06/21/2022 2     Basophils Relative 06/21/2022 1     Neutrophils Absolute 06/21/2022 3 82     Immature Grans Absolute 06/21/2022 0 02     Lymphocytes Absolute 06/21/2022 2 20     Monocytes Absolute 06/21/2022 0 73     Eosinophils Absolute 06/21/2022 0 13     Basophils Absolute 06/21/2022 0 05     Sodium 06/21/2022 139     Potassium 06/21/2022 4 1     Chloride 06/21/2022 103     CO2 06/21/2022 31     ANION GAP 06/21/2022 5     BUN 06/21/2022 17     Creatinine 06/21/2022 1 13     Glucose 06/21/2022 127     Calcium 06/21/2022 9 4     AST 06/21/2022 25     ALT 06/21/2022 39     Alkaline Phosphatase 06/21/2022 90     Total Protein 06/21/2022 7  7     Albumin 06/21/2022 3 7     Total Bilirubin 06/21/2022 0 97     eGFR 06/21/2022 65    Transcribe Orders on 06/21/2022   Component Date Value    Clarity, UA 06/21/2022 Slightly Cloudy     Color, UA 06/21/2022 Yellow     Specific Gravity, UA 06/21/2022 1 020     pH, UA 06/21/2022 6 0     Glucose, UA 06/21/2022 Negative     Ketones, UA 06/21/2022 Negative     Occult Blood, UA 06/21/2022 Negative     Protein, UA 06/21/2022 Negative     Nitrite, UA 06/21/2022 Negative     Bilirubin, UA 06/21/2022 Negative     Urobilinogen, UA 06/21/2022 0 2     Leukocytes, UA 06/21/2022 Trace (A)    WBC, UA 06/21/2022 0-1 (A)    RBC, UA 06/21/2022 None Seen     Bacteria, UA 06/21/2022 None Seen     Epithelial Cells 06/21/2022 None Seen         Assessment:     70 y o  male with planned surgery as above  Known risk factors for perioperative complications: None    Difficulty with intubation is not anticipated  Cardiac Risk Estimation:  Minimal    Current medications which may produce withdrawal symptoms if withheld perioperatively:  None      Plan:  Patient is CLEARED for surgery without any additional cardiac testing  1  Preoperative workup as follows preop blood work,ecg,cxr reviewed and acceptable  2  Change in medication regimen before surgery: discontinue ASA 14 days before surgery and discontinue NSAIDs (14) 14 days before surgery  3  Prophylaxis for cardiac events with perioperative beta-blockers: not indicated  4  Invasive hemodynamic monitoring perioperatively: not indicated  5  Deep vein thrombosis prophylaxis postoperatively:regimen to be chosen by surgical team   6  Other measures: Consultation with Hospitalist for in-hospital postoperative management of Postoperative cardiac or hypertensive issues

## 2022-10-11 ENCOUNTER — OFFICE VISIT (OUTPATIENT)
Dept: FAMILY MEDICINE CLINIC | Facility: CLINIC | Age: 72
End: 2022-10-11
Payer: COMMERCIAL

## 2022-10-11 VITALS
HEIGHT: 74 IN | BODY MASS INDEX: 31.95 KG/M2 | SYSTOLIC BLOOD PRESSURE: 128 MMHG | TEMPERATURE: 97.5 F | WEIGHT: 249 LBS | HEART RATE: 80 BPM | DIASTOLIC BLOOD PRESSURE: 80 MMHG | OXYGEN SATURATION: 99 %

## 2022-10-11 DIAGNOSIS — S13.9XXA NECK SPRAIN, INITIAL ENCOUNTER: Primary | ICD-10-CM

## 2022-10-11 PROCEDURE — 99213 OFFICE O/P EST LOW 20 MIN: CPT | Performed by: FAMILY MEDICINE

## 2022-10-11 RX ORDER — METHOCARBAMOL 500 MG/1
500 TABLET, FILM COATED ORAL 4 TIMES DAILY
Qty: 20 TABLET | Refills: 0
Start: 2022-10-11

## 2022-10-11 RX ORDER — PREDNISONE 20 MG/1
20 TABLET ORAL 2 TIMES DAILY WITH MEALS
Qty: 4 TABLET | Refills: 0 | Status: SHIPPED | OUTPATIENT
Start: 2022-10-11

## 2022-10-11 NOTE — PROGRESS NOTES
Assessment/Plan:    Problem List Items Addressed This Visit    None     Visit Diagnoses     Neck sprain, initial encounter    -  Primary           Diagnoses and all orders for this visit:    Neck sprain, initial encounter        No problem-specific Assessment & Plan notes found for this encounter  Subjective:      Patient ID: Bhavana Christian is a 70 y o  male  Working on [employer]  Odd posture  Neck stiff no fall no trauma      The following portions of the patient's history were reviewed and updated as appropriate:   He has a past medical history of CAD (coronary artery disease)  ,  does not have any pertinent problems on file  ,   has a past surgical history that includes Coronary artery bypass graft (05/2001); Tonsillectomy; Adenoidectomy; Nasal septum surgery; Carpal tunnel release (Bilateral); and Lithotripsy (10/28/2020)  ,  family history includes Abdominal aortic aneurysm in his father; Lung cancer in his mother  ,   reports that he has never smoked  He has never used smokeless tobacco  He reports current alcohol use  He reports that he does not use drugs  ,  has No Known Allergies     Current Outpatient Medications   Medication Sig Dispense Refill   • aspirin 81 MG tablet Take by mouth every other day Three times weekly     • brimonidine-timolol (Combigan) 0 2-0 5 % Administer 1 drop to the right eye every 12 (twelve) hours     • carvedilol (COREG CR) 10 MG 24 hr capsule Take 1 capsule (10 mg total) by mouth 2 (two) times a day 180 capsule 2   • Docosahexaenoic Acid (DHA OMEGA 3 PO) Take by mouth daily 1000 mg daily     • ezetimibe (ZETIA) 10 mg tablet Take 1 tablet (10 mg total) by mouth daily 90 tablet 3   • folic acid (FOLVITE) 227 mcg tablet Take 1 tablet by mouth daily     • Multiple Vitamin (DAILY VALUE MULTIVITAMIN PO) Take 1 tablet by mouth daily     • Multiple Vitamins-Minerals (PRESERVISION AREDS 2 PO) Take 1 capsule by mouth 2 (two) times a day     • olmesartan (BENICAR) 40 mg tablet Take 1 tablet (40 mg total) by mouth daily 90 tablet 2   • ranibizumab (Lucentis) 0 5 mg/0 05mL Administer 0 5 mg to both eyes every 30 (thirty) days     • rosuvastatin (CRESTOR) 20 MG tablet Take 1 tablet (20 mg total) by mouth daily 90 tablet 2   • celecoxib (CeleBREX) 200 mg capsule take 1 capsule by mouth daily for 3 days PRIOR TO SURGERY DO NOT      (REFER TO PRESCRIPTION NOTES)  (Patient not taking: No sig reported)     • chlorhexidine (Hibiclens) 4 % external liquid Hibiclens 4 % topical liquid   Wash surgical site daily starting five days before surgery (Patient not taking: No sig reported)     • imiquimod (ALDARA) 5 % cream APPLY TO FACE MONDAY, WEDNESDAY, AND FRIDAY AFTER SUPPER FOR 4 WEEKS, WASH OFF IN THE MORNING (Patient not taking: No sig reported)     • mupirocin (BACTROBAN) 2 % ointment APPLY A SMALL AMOUNT TO BOTH NOSTRILS TWICE DAILY FOR 5 DAYS PRIOR TO SURGERY (Patient not taking: No sig reported)       No current facility-administered medications for this visit  Review of Systems   Constitutional: Positive for activity change  Negative for appetite change, diaphoresis, fatigue and fever  HENT: Negative  Eyes: Negative  Respiratory: Negative for apnea, cough, chest tightness, shortness of breath and wheezing  Cardiovascular: Negative for chest pain, palpitations and leg swelling  Gastrointestinal: Negative for abdominal distention, abdominal pain, anal bleeding, constipation, diarrhea, nausea and vomiting  Endocrine: Negative for cold intolerance, heat intolerance, polydipsia, polyphagia and polyuria  Genitourinary: Negative for difficulty urinating, dysuria, flank pain, hematuria and urgency  Musculoskeletal: Positive for neck pain and neck stiffness  Negative for arthralgias, back pain, gait problem, joint swelling and myalgias  Skin: Negative for color change, rash and wound     Allergic/Immunologic: Negative for environmental allergies, food allergies and immunocompromised state  Neurological: Negative for dizziness, seizures, syncope, speech difficulty, numbness and headaches  Hematological: Negative for adenopathy  Does not bruise/bleed easily  Psychiatric/Behavioral: Negative for agitation, behavioral problems, hallucinations, sleep disturbance and suicidal ideas  Objective:  Vitals:    10/11/22 1023 10/11/22 1034   BP: 162/80 128/80   BP Location: Left arm Left arm   Patient Position: Sitting Sitting   Cuff Size: Standard Standard   Pulse: 80    Temp: 97 5 °F (36 4 °C)    TempSrc: Temporal    SpO2: 99%    Weight: 113 kg (249 lb)    Height: 6' 2" (1 88 m)      Body mass index is 31 97 kg/m²  Physical Exam  Constitutional:       General: He is not in acute distress  Appearance: He is well-developed  He is not diaphoretic  HENT:      Head: Normocephalic  Right Ear: External ear normal       Left Ear: External ear normal       Nose: Nose normal    Eyes:      General: No scleral icterus  Right eye: No discharge  Left eye: No discharge  Conjunctiva/sclera: Conjunctivae normal       Pupils: Pupils are equal, round, and reactive to light  Neck:      Thyroid: No thyromegaly  Trachea: No tracheal deviation  Cardiovascular:      Rate and Rhythm: Normal rate and regular rhythm  Heart sounds: Normal heart sounds  No murmur heard  No friction rub  No gallop  Pulmonary:      Effort: Pulmonary effort is normal  No respiratory distress  Breath sounds: Normal breath sounds  No wheezing  Abdominal:      General: Bowel sounds are normal       Palpations: Abdomen is soft  There is no mass  Tenderness: There is no abdominal tenderness  There is no guarding  Musculoskeletal:         General: No deformity  Cervical back: Normal range of motion  Comments: Restricted range of motion stiffness of neck   Lymphadenopathy:      Cervical: No cervical adenopathy     Skin:     General: Skin is warm and dry       Findings: No erythema or rash  Neurological:      Mental Status: He is alert and oriented to person, place, and time  Cranial Nerves: No cranial nerve deficit  Psychiatric:         Thought Content:  Thought content normal

## 2022-11-21 ENCOUNTER — TELEPHONE (OUTPATIENT)
Dept: FAMILY MEDICINE CLINIC | Facility: CLINIC | Age: 72
End: 2022-11-21

## 2022-11-21 DIAGNOSIS — R07.81 PAIN IN RIB: Primary | ICD-10-CM

## 2022-11-21 DIAGNOSIS — N20.0 RENAL CALCULI: ICD-10-CM

## 2022-11-21 NOTE — TELEPHONE ENCOUNTER
Pt has pain right rib area, not sure if it is rib fracture or kidney stones,start 11/19, has appt 11/29 wants to be seen sooner if possible or order for xray, please advise

## 2022-11-23 ENCOUNTER — RA CDI HCC (OUTPATIENT)
Dept: OTHER | Facility: HOSPITAL | Age: 72
End: 2022-11-23

## 2022-11-23 NOTE — PROGRESS NOTES
[facility]  coding opportunities       Chart reviewed, no opportunity found:   [address]        Patients Insurance     Medicare Insurance: [employer]

## 2022-11-29 ENCOUNTER — OFFICE VISIT (OUTPATIENT)
Dept: FAMILY MEDICINE CLINIC | Facility: CLINIC | Age: 72
End: 2022-11-29

## 2022-11-29 VITALS
TEMPERATURE: 97.3 F | HEART RATE: 79 BPM | DIASTOLIC BLOOD PRESSURE: 80 MMHG | SYSTOLIC BLOOD PRESSURE: 130 MMHG | HEIGHT: 73 IN | OXYGEN SATURATION: 98 % | BODY MASS INDEX: 33.66 KG/M2 | WEIGHT: 254 LBS

## 2022-11-29 DIAGNOSIS — Z00.00 MEDICARE ANNUAL WELLNESS VISIT, SUBSEQUENT: Primary | ICD-10-CM

## 2022-11-29 RX ORDER — CEPHALEXIN 500 MG/1
TABLET ORAL
COMMUNITY
Start: 2022-11-22

## 2022-11-29 NOTE — PROGRESS NOTES
Assessment and Plan:     Problem List Items Addressed This Visit    None      Depression Screening and Follow-up Plan: Patient was screened for depression during today's encounter  They screened negative with a PHQ-2 score of 0  Preventive health issues were discussed with patient, and age appropriate screening tests were ordered as noted in patient's After Visit Summary  Personalized health advice and appropriate referrals for health education or preventive services given if needed, as noted in patient's After Visit Summary  History of Present Illness:     Patient presents for a Medicare Wellness Visit    Mr Lazaro briones is here today for the express purposes of a Medicare well visit however any issues will be addressed during the well visit     Patient Care Team:  Keke Pedraza DO as PCP - General     Review of Systems:     Review of Systems   Constitutional: Negative for activity change, appetite change, diaphoresis, fatigue and fever  HENT: Positive for postnasal drip  Negative for dental problem, hearing loss and tinnitus  Eyes: Positive for visual disturbance  Patient has had cataract surgery in both eyes he does have macular degeneration he goes to Dr Rachel Hatfield and a he does receive intra-ocular injections he has been getting very good reports and his visual acuity is very good   Respiratory: Negative for apnea, cough, chest tightness, shortness of breath and wheezing  Cardiovascular: Negative for chest pain, palpitations and leg swelling  Saw cardiologist recently  Doing well  Follows yearly   Gastrointestinal: Negative for abdominal distention, abdominal pain, anal bleeding, constipation, diarrhea, nausea and vomiting  Endocrine: Negative for cold intolerance, heat intolerance, polydipsia, polyphagia and polyuria  Genitourinary: Negative for difficulty urinating, dysuria, flank pain, hematuria and urgency          Nocturia  Follows with Dr Yoshi Wilder   Musculoskeletal: Positive for arthralgias  Negative for back pain, gait problem, joint swelling and myalgias  Anticipates a right total knee arthroplasty in April   Skin: Negative for color change, rash and wound  Allergic/Immunologic: Negative for environmental allergies, food allergies and immunocompromised state  Neurological: Negative for dizziness, seizures, syncope, speech difficulty, numbness and headaches  Hematological: Negative for adenopathy  Does not bruise/bleed easily  Psychiatric/Behavioral: Negative for agitation, behavioral problems, hallucinations, sleep disturbance and suicidal ideas  The patient is nervous/anxious  Problem List:     Patient Active Problem List   Diagnosis   • ARMD (age related macular degeneration)   • Hypercholesteremia   • Hypertension   • CAD (coronary artery disease)   • Hx of CABG in 2001      Past Medical and Surgical History:     Past Medical History:   Diagnosis Date   • CAD (coronary artery disease)      Past Surgical History:   Procedure Laterality Date   • ADENOIDECTOMY     • CARPAL TUNNEL RELEASE Bilateral    • CORONARY ARTERY BYPASS GRAFT  05/2001    x4   • LITHOTRIPSY  10/28/2020    Dr Carol Briggs   • NASAL SEPTUM SURGERY     • TONSILLECTOMY        Family History:     Family History   Problem Relation Age of Onset   • Lung cancer Mother    • Abdominal aortic aneurysm Father       Social History:     Social History     Socioeconomic History   • Marital status: /Civil Union     Spouse name: None   • Number of children: None   • Years of education: None   • Highest education level: None   Occupational History   • None   Tobacco Use   • Smoking status: Never   • Smokeless tobacco: Never   Vaping Use   • Vaping Use: Never used   Substance and Sexual Activity   • Alcohol use:  Yes   • Drug use: Never   • Sexual activity: None   Other Topics Concern   • None   Social History Narrative   • None     Social Determinants of Health     Financial Resource Strain: Not on file   Food Insecurity: Not on file   Transportation Needs: Not on file   Physical Activity: Not on file   Stress: Not on file   Social Connections: Not on file   Intimate Partner Violence: Not on file   Housing Stability: Not on file      Medications and Allergies:     Current Outpatient Medications   Medication Sig Dispense Refill   • aspirin 81 MG tablet Take by mouth every other day Three times weekly     • brimonidine-timolol (Combigan) 0 2-0 5 % Administer 1 drop to the right eye every 12 (twelve) hours     • carvedilol (COREG CR) 10 MG 24 hr capsule Take 1 capsule (10 mg total) by mouth 2 (two) times a day 180 capsule 2   • Cephalexin 500 MG tablet TAKE 4 TABLETS BY MOUTH ONE HOUR PRIOR TO PROCEDURE     • Docosahexaenoic Acid (DHA OMEGA 3 PO) Take by mouth daily 1000 mg daily     • ezetimibe (ZETIA) 10 mg tablet Take 1 tablet (10 mg total) by mouth daily 90 tablet 3   • folic acid (FOLVITE) 950 mcg tablet Take 1 tablet by mouth daily     • methocarbamol (ROBAXIN) 500 mg tablet Take 1 tablet (500 mg total) by mouth 4 (four) times a day 20 tablet 0   • Multiple Vitamin (DAILY VALUE MULTIVITAMIN PO) Take 1 tablet by mouth daily     • Multiple Vitamins-Minerals (PRESERVISION AREDS 2 PO) Take 1 capsule by mouth 2 (two) times a day     • olmesartan (BENICAR) 40 mg tablet Take 1 tablet (40 mg total) by mouth daily 90 tablet 2   • predniSONE 20 mg tablet Take 1 tablet (20 mg total) by mouth 2 (two) times a day with meals 4 tablet 0   • ranibizumab (Lucentis) 0 5 mg/0 05mL Administer 0 5 mg to both eyes every 30 (thirty) days     • rosuvastatin (CRESTOR) 20 MG tablet Take 1 tablet (20 mg total) by mouth daily 90 tablet 2   • celecoxib (CeleBREX) 200 mg capsule take 1 capsule by mouth daily for 3 days PRIOR TO SURGERY DO NOT      (REFER TO PRESCRIPTION NOTES)   (Patient not taking: Reported on 6/29/2022)     • chlorhexidine (Hibiclens) 4 % external liquid Hibiclens 4 % topical liquid   Wash surgical site daily starting five days before surgery (Patient not taking: Reported on 6/29/2022)     • imiquimod (ALDARA) 5 % cream APPLY TO FACE MONDAY, WEDNESDAY, AND FRIDAY AFTER SUPPER FOR 4 WEEKS, WASH OFF IN THE MORNING (Patient not taking: Reported on 6/29/2022)     • mupirocin (BACTROBAN) 2 % ointment APPLY A SMALL AMOUNT TO BOTH NOSTRILS TWICE DAILY FOR 5 DAYS PRIOR TO SURGERY (Patient not taking: Reported on 6/29/2022)       No current facility-administered medications for this visit  No Known Allergies   Immunizations:     Immunization History   Administered Date(s) Administered   • COVID-19 MODERNA VACC 0 5 ML IM 03/10/2021, 04/07/2021, 11/03/2021, 04/20/2022   • INFLUENZA 10/07/2020, 10/08/2021   • Influenza Quadrivalent Preservative Free 3 years and older IM 10/01/2014   • Influenza Split High Dose Preservative Free IM 10/22/2015, 10/04/2016, 10/11/2017   • Influenza, high dose seasonal 0 7 mL 09/27/2018   • Influenza, seasonal, injectable 10/11/2012, 09/26/2013   • Pneumococcal Conjugate 13-Valent 10/20/2018   • Pneumococcal Polysaccharide PPV23 05/28/2014   • Tdap 08/22/2020   • influenza, trivalent, adjuvanted 10/08/2019      Health Maintenance:         Topic Date Due   • Hepatitis C Screening  Never done   • Colorectal Cancer Screening  Never done         Topic Date Due   • Hepatitis B Vaccine (1 of 3 - 3-dose series) Never done   • Pneumococcal Vaccine: 65+ Years (3) 10/20/2019   • Influenza Vaccine (1) 09/01/2022      Medicare Screening Tests and Risk Assessments:     Mishel De La Paz is here for his Subsequent Wellness visit  Health Risk Assessment:   Patient rates overall health as very good  Patient feels that their physical health rating is slightly better  Eyesight was rated as same  Hearing was rated as same  Patient feels that their emotional and mental health rating is same  Patients states they are never, rarely angry  Patient states they are never, rarely unusually tired/fatigued   Pain experienced in the last 7 days has been some  Patient's pain rating has been 3/10  Patient states that he has experienced no weight loss or gain in last 6 months  Depression Screening:   PHQ-2 Score: 0      Fall Risk Screening: In the past year, patient has experienced: no history of falling in past year      Home Safety:  Patient does not have trouble with stairs inside or outside of their home  Patient has working smoke alarms and has working carbon monoxide detector  Home safety hazards include: none  Nutrition:   Current diet is Regular, Limited junk food and No Added Salt  Medications:   Patient is currently taking over-the-counter supplements  OTC medications include: see medication list  Patient is able to manage medications  Activities of Daily Living (ADLs)/Instrumental Activities of Daily Living (IADLs):   Walk and transfer into and out of bed and chair?: Yes  Dress and groom yourself?: Yes    Bathe or shower yourself?: Yes    Feed yourself? Yes  Do your laundry/housekeeping?: Yes  Manage your money, pay your bills and track your expenses?: Yes  Make your own meals?: Yes    Do your own shopping?: Yes    Previous Hospitalizations:   Any hospitalizations or ED visits within the last 12 months?: Yes    How many hospitalizations have you had in the last year?: 1-2    Advance Care Planning:   Living will: Yes    Durable POA for healthcare:  Yes    Advanced directive: Yes    Advanced directive counseling given: Yes    Five wishes given: No    Patient declined ACP directive: Yes    End of Life Decisions reviewed with patient: Yes    Provider agrees with end of life decisions: Yes      Cognitive Screening:   Provider or family/friend/caregiver concerned regarding cognition?: No    PREVENTIVE SCREENINGS      Cardiovascular Screening:    General: Screening Not Indicated and History Lipid Disorder      Diabetes Screening:     General: Screening Current      Colorectal Cancer Screening:     General: Patient Declines Prostate Cancer Screening:    General: Patient Declines      Osteoporosis Screening:    General: Screening Not Indicated      Abdominal Aortic Aneurysm (AAA) Screening:    Risk factors include: family history of AAA and age between 73-69 yo        Lung Cancer Screening:     General: Screening Not Indicated      Hepatitis C Screening:    General: Screening Not Indicated    Screening, Brief Intervention, and Referral to Treatment (SBIRT)    Screening  Typical number of drinks in a day: 0  Typical number of drinks in a week: 0  Interpretation: Low risk drinking behavior  Single Item Drug Screening:  How often have you used an illegal drug (including marijuana) or a prescription medication for non-medical reasons in the past year? never    Single Item Drug Screen Score: 0  Interpretation: Negative screen for possible drug use disorder    No results found  Physical Exam:     /80 (BP Location: Left arm, Patient Position: Sitting, Cuff Size: Large)   Pulse 79   Temp (!) 97 3 °F (36 3 °C) (Temporal)   Ht 6' 1" (1 854 m)   Wt 115 kg (254 lb)   SpO2 98%   BMI 33 51 kg/m²     Physical Exam  Constitutional:       Appearance: He is well-developed and well-nourished  He is obese  HENT:      Head: Normocephalic and atraumatic  Right Ear: External ear normal       Left Ear: External ear normal       Nose: Nose normal       Mouth/Throat:      Mouth: Oropharynx is clear and moist    Eyes:      Extraocular Movements: EOM normal       Conjunctiva/sclera: Conjunctivae normal       Pupils: Pupils are equal, round, and reactive to light  Cardiovascular:      Rate and Rhythm: Normal rate and regular rhythm  Pulses: Intact distal pulses  Heart sounds: Normal heart sounds  No murmur heard  No friction rub  Pulmonary:      Effort: Pulmonary effort is normal  No respiratory distress  Breath sounds: Normal breath sounds  No wheezing or rales  Chest:      Chest wall: No tenderness     Abdominal: General: Bowel sounds are normal       Palpations: Abdomen is soft  Musculoskeletal:         General: Normal range of motion  Cervical back: Normal range of motion and neck supple  Comments: Right rib pain posterior axillary line  Probable hairline fraction   Skin:     General: Skin is warm and dry  Capillary Refill: Capillary refill takes 2 to 3 seconds  Neurological:      Mental Status: He is alert and oriented to person, place, and time  Psychiatric:         Mood and Affect: Mood and affect normal          Behavior: Behavior normal          Thought Content:  Thought content normal          Judgment: Judgment normal           Damaso Cota DO

## 2022-11-29 NOTE — PATIENT INSTRUCTIONS
Medicare Preventive Visit Patient Instructions  Thank you for completing your Welcome to Medicare Visit or Medicare Annual Wellness Visit today  Your next wellness visit will be due in one year (11/30/2023)  The screening/preventive services that you may require over the next 5-10 years are detailed below  Some tests may not apply to you based off risk factors and/or age  Screening tests ordered at today's visit but not completed yet may show as past due  Also, please note that scanned in results may not display below  Preventive Screenings:  Service Recommendations Previous Testing/Comments   Colorectal Cancer Screening  · Colonoscopy    · Fecal Occult Blood Test (FOBT)/Fecal Immunochemical Test (FIT)  · Fecal DNA/Cologuard Test  · Flexible Sigmoidoscopy Age: 39-70 years old   Colonoscopy: every 10 years (May be performed more frequently if at higher risk)  OR  FOBT/FIT: every 1 year  OR  Cologuard: every 3 years  OR  Sigmoidoscopy: every 5 years  Screening may be recommended earlier than age 39 if at higher risk for colorectal cancer  Also, an individualized decision between you and your healthcare provider will decide whether screening between the ages of 74-80 would be appropriate   Colonoscopy: Not on file  FOBT/FIT: Not on file  Cologuard: Not on file  Sigmoidoscopy: Not on file          Prostate Cancer Screening Individualized decision between patient and health care provider in men between ages of 53-78   Medicare will cover every 12 months beginning on the day after your 50th birthday PSA: No results in last 5 years           Hepatitis C Screening Once for adults born between 80 and 1965  More frequently in patients at high risk for Hepatitis C Hep C Antibody: Not on file        Diabetes Screening 1-2 times per year if you're at risk for diabetes or have pre-diabetes Fasting glucose: 121 mg/dL (10/8/2021)  A1C: No results in last 5 years (No results in last 5 years)  Screening Current   Cholesterol Screening Once every 5 years if you don't have a lipid disorder  May order more often based on risk factors  Lipid panel: 10/08/2021  Screening Not Indicated  History Lipid Disorder      Other Preventive Screenings Covered by Medicare:  1  Abdominal Aortic Aneurysm (AAA) Screening: covered once if your at risk  You're considered to be at risk if you have a family history of AAA or a male between the age of 73-68 who smoking at least 100 cigarettes in your lifetime  2  Lung Cancer Screening: covers low dose CT scan once per year if you meet all of the following conditions: (1) Age 50-69; (2) No signs or symptoms of lung cancer; (3) Current smoker or have quit smoking within the last 15 years; (4) You have a tobacco smoking history of at least 20 pack years (packs per day x number of years you smoked); (5) You get a written order from a healthcare provider  3  Glaucoma Screening: covered annually if you're considered high risk: (1) You have diabetes OR (2) Family history of glaucoma OR (3)  aged 48 and older OR (3)  American aged 72 and older  3  Osteoporosis Screening: covered every 2 years if you meet one of the following conditions: (1) Have a vertebral abnormality; (2) On glucocorticoid therapy for more than 3 months; (3) Have primary hyperparathyroidism; (4) On osteoporosis medications and need to assess response to drug therapy  5  HIV Screening: covered annually if you're between the age of 12-76  Also covered annually if you are younger than 13 and older than 72 with risk factors for HIV infection  For pregnant patients, it is covered up to 3 times per pregnancy      Immunizations:  Immunization Recommendations   Influenza Vaccine Annual influenza vaccination during flu season is recommended for all persons aged >= 6 months who do not have contraindications   Pneumococcal Vaccine   * Pneumococcal conjugate vaccine = PCV13 (Prevnar 13), PCV15 (Vaxneuvance), PCV20 (Prevnar 20)  * Pneumococcal polysaccharide vaccine = PPSV23 (Pneumovax) Adults 2364 years old: 1-3 doses may be recommended based on certain risk factors  Adults 72 years old: 1-2 doses may be recommended based off what pneumonia vaccine you previously received   Hepatitis B Vaccine 3 dose series if at intermediate or high risk (ex: diabetes, end stage renal disease, liver disease)   Tetanus (Td) Vaccine - COST NOT COVERED BY MEDICARE PART B Following completion of primary series, a booster dose should be given every 10 years to maintain immunity against tetanus  Td may also be given as tetanus wound prophylaxis  Tdap Vaccine - COST NOT COVERED BY MEDICARE PART B Recommended at least once for all adults  For pregnant patients, recommended with each pregnancy  Shingles Vaccine (Shingrix) - COST NOT COVERED BY MEDICARE PART B  2 shot series recommended in those aged 48 and above     Health Maintenance Due:      Topic Date Due   • Hepatitis C Screening  Never done   • Colorectal Cancer Screening  Never done     Immunizations Due:      Topic Date Due   • Hepatitis B Vaccine (1 of 3 - 3-dose series) Never done   • Pneumococcal Vaccine: 65+ Years (3) 10/20/2019   • Influenza Vaccine (1) 09/01/2022     Advance Directives   What are advance directives? Advance directives are legal documents that state your wishes and plans for medical care  These plans are made ahead of time in case you lose your ability to make decisions for yourself  Advance directives can apply to any medical decision, such as the treatments you want, and if you want to donate organs  What are the types of advance directives? There are many types of advance directives, and each state has rules about how to use them  You may choose a combination of any of the following:  · Living will: This is a written record of the treatment you want  You can also choose which treatments you do not want, which to limit, and which to stop at a certain time   This includes surgery, medicine, IV fluid, and tube feedings  · Durable power of  for healthcare [facility]): This is a written record that states who you want to make healthcare choices for you when you are unable to make them for yourself  This person, called a proxy, is usually a family member or a friend  You may choose more than 1 proxy  · Do not resuscitate (DNR) order:  A DNR order is used in case your heart stops beating or you stop breathing  It is a request not to have certain forms of treatment, such as CPR  A DNR order may be included in other types of advance directives  · Medical directive: This covers the care that you want if you are in a coma, near death, or unable to make decisions for yourself  You can list the treatments you want for each condition  Treatment may include pain medicine, surgery, blood transfusions, dialysis, IV or tube feedings, and a ventilator (breathing machine)  · Values history: This document has questions about your views, beliefs, and how you feel and think about life  This information can help others choose the care that you would choose  Why are advance directives important? An advance directive helps you control your care  Although spoken wishes may be used, it is better to have your wishes written down  Spoken wishes can be misunderstood, or not followed  Treatments may be given even if you do not want them  An advance directive may make it easier for your family to make difficult choices about your care  Weight Management   Why it is important to manage your weight:  Being overweight increases your risk of health conditions such as heart disease, high blood pressure, type 2 diabetes, and certain types of cancer  It can also increase your risk for osteoarthritis, sleep apnea, and other respiratory problems  Aim for a slow, steady weight loss  Even a small amount of weight loss can lower your risk of health problems    How to lose weight safely:  A safe and healthy way to lose weight is to eat fewer calories and get regular exercise  You can lose up about 1 pound a week by decreasing the number of calories you eat by 500 calories each day  Healthy meal plan for weight management:  A healthy meal plan includes a variety of foods, contains fewer calories, and helps you stay healthy  A healthy meal plan includes the following:  · Eat whole-grain foods more often  A healthy meal plan should contain fiber  Fiber is the part of grains, fruits, and vegetables that is not broken down by your body  Whole-grain foods are healthy and provide extra fiber in your diet  Some examples of whole-grain foods are whole-wheat breads and pastas, oatmeal, brown rice, and bulgur  · Eat a variety of vegetables every day  Include dark, leafy greens such as spinach, kale, don greens, and mustard greens  Eat yellow and orange vegetables such as carrots, sweet potatoes, and winter squash  · Eat a variety of fruits every day  Choose fresh or canned fruit (canned in its own juice or light syrup) instead of juice  Fruit juice has very little or no fiber  · Eat low-fat dairy foods  Drink fat-free (skim) milk or 1% milk  Eat fat-free yogurt and low-fat cottage cheese  Try low-fat cheeses such as mozzarella and other reduced-fat cheeses  · Choose meat and other protein foods that are low in fat  Choose beans or other legumes such as split peas or lentils  Choose fish, skinless poultry (chicken or turkey), or lean cuts of red meat (beef or pork)  Before you cook meat or poultry, cut off any visible fat  · Use less fat and oil  Try baking foods instead of frying them  Add less fat, such as margarine, sour cream, regular salad dressing and mayonnaise to foods  Eat fewer high-fat foods  Some examples of high-fat foods include french fries, doughnuts, ice cream, and cakes  · Eat fewer sweets  Limit foods and drinks that are high in sugar   This includes candy, cookies, regular soda, and sweetened drinks  Exercise:  Exercise at least 30 minutes per day on most days of the week  Some examples of exercise include walking, biking, dancing, and swimming  You can also fit in more physical activity by taking the stairs instead of the elevator or parking farther away from stores  Ask your healthcare provider about the best exercise plan for you  © Copyright [employer] 2018 Information is for End User's use only and may not be sold, redistributed or otherwise used for commercial purposes   All illustrations and images included in CareNotes® are the copyrighted property of A D A M , [employer]  or [address] [place] [address] [employer]

## 2023-04-06 ENCOUNTER — APPOINTMENT (OUTPATIENT)
Dept: LAB | Facility: HOSPITAL | Age: 73
End: 2023-04-06
Attending: ORTHOPAEDIC SURGERY

## 2023-04-06 ENCOUNTER — HOSPITAL ENCOUNTER (OUTPATIENT)
Dept: RADIOLOGY | Facility: HOSPITAL | Age: 73
Discharge: HOME | End: 2023-04-06
Attending: ORTHOPAEDIC SURGERY

## 2023-04-06 DIAGNOSIS — Z01.818 OTHER SPECIFIED PRE-OPERATIVE EXAMINATION: ICD-10-CM

## 2023-04-06 DIAGNOSIS — Z01.89 RADIOLOGICAL EXAMINATION, NOT ELSEWHERE CLASSIFIED: ICD-10-CM

## 2023-04-06 LAB
ALBUMIN SERPL BCP-MCNC: 4 G/DL (ref 3.5–5)
ALP SERPL-CCNC: 93 U/L (ref 34–104)
ALT SERPL W P-5'-P-CCNC: 22 U/L (ref 7–52)
ANION GAP SERPL CALCULATED.3IONS-SCNC: 9 MMOL/L (ref 4–13)
AST SERPL W P-5'-P-CCNC: 20 U/L (ref 13–39)
BASOPHILS # BLD AUTO: 0.05 THOUSANDS/ÂΜL (ref 0–0.1)
BASOPHILS NFR BLD AUTO: 1 % (ref 0–1)
BILIRUB SERPL-MCNC: 1.06 MG/DL (ref 0.2–1)
BUN SERPL-MCNC: 16 MG/DL (ref 5–25)
CALCIUM SERPL-MCNC: 9.4 MG/DL (ref 8.4–10.2)
CHLORIDE SERPL-SCNC: 103 MMOL/L (ref 96–108)
CO2 SERPL-SCNC: 28 MMOL/L (ref 21–32)
CREAT SERPL-MCNC: 1.04 MG/DL (ref 0.6–1.3)
EOSINOPHIL # BLD AUTO: 0.13 THOUSAND/ÂΜL (ref 0–0.61)
EOSINOPHIL NFR BLD AUTO: 2 % (ref 0–6)
ERYTHROCYTE [DISTWIDTH] IN BLOOD BY AUTOMATED COUNT: 13.4 % (ref 11.6–15.1)
GFR SERPL CREATININE-BSD FRML MDRD: 71 ML/MIN/1.73SQ M
GLUCOSE P FAST SERPL-MCNC: 128 MG/DL (ref 65–99)
HCT VFR BLD AUTO: 41.9 % (ref 36.5–49.3)
HGB BLD-MCNC: 13.7 G/DL (ref 12–17)
IMM GRANULOCYTES # BLD AUTO: 0.02 THOUSAND/UL (ref 0–0.2)
IMM GRANULOCYTES NFR BLD AUTO: 0 % (ref 0–2)
LYMPHOCYTES # BLD AUTO: 2.22 THOUSANDS/ÂΜL (ref 0.6–4.47)
LYMPHOCYTES NFR BLD AUTO: 35 % (ref 14–44)
MCH RBC QN AUTO: 30.6 PG (ref 26.8–34.3)
MCHC RBC AUTO-ENTMCNC: 32.7 G/DL (ref 31.4–37.4)
MCV RBC AUTO: 94 FL (ref 82–98)
MONOCYTES # BLD AUTO: 0.58 THOUSAND/ÂΜL (ref 0.17–1.22)
MONOCYTES NFR BLD AUTO: 9 % (ref 4–12)
NEUTROPHILS # BLD AUTO: 3.38 THOUSANDS/ÂΜL (ref 1.85–7.62)
NEUTS SEG NFR BLD AUTO: 53 % (ref 43–75)
NRBC BLD AUTO-RTO: 0 /100 WBCS
PLATELET # BLD AUTO: 356 THOUSANDS/UL (ref 149–390)
PMV BLD AUTO: 9.8 FL (ref 8.9–12.7)
POTASSIUM SERPL-SCNC: 4.1 MMOL/L (ref 3.5–5.3)
PROT SERPL-MCNC: 6.9 G/DL (ref 6.4–8.4)
RBC # BLD AUTO: 4.47 MILLION/UL (ref 3.88–5.62)
SODIUM SERPL-SCNC: 140 MMOL/L (ref 135–147)
WBC # BLD AUTO: 6.38 THOUSAND/UL (ref 4.31–10.16)

## 2023-08-30 ENCOUNTER — VBI (OUTPATIENT)
Dept: ADMINISTRATIVE | Facility: OTHER | Age: 73
End: 2023-08-30

## 2023-12-19 ENCOUNTER — RA CDI HCC (OUTPATIENT)
Dept: OTHER | Facility: HOSPITAL | Age: 73
End: 2023-12-19

## 2023-12-27 ENCOUNTER — CONSULT (OUTPATIENT)
Dept: FAMILY MEDICINE CLINIC | Facility: CLINIC | Age: 73
End: 2023-12-27
Payer: COMMERCIAL

## 2023-12-27 VITALS
SYSTOLIC BLOOD PRESSURE: 142 MMHG | DIASTOLIC BLOOD PRESSURE: 84 MMHG | OXYGEN SATURATION: 97 % | BODY MASS INDEX: 36.58 KG/M2 | WEIGHT: 276 LBS | TEMPERATURE: 97.8 F | HEIGHT: 73 IN | HEART RATE: 95 BPM

## 2023-12-27 DIAGNOSIS — N20.0 RENAL CALCULI: ICD-10-CM

## 2023-12-27 DIAGNOSIS — I25.10 CORONARY ARTERY DISEASE INVOLVING NATIVE CORONARY ARTERY OF NATIVE HEART WITHOUT ANGINA PECTORIS: ICD-10-CM

## 2023-12-27 DIAGNOSIS — Z01.818 PREOP EXAMINATION: Primary | ICD-10-CM

## 2023-12-27 PROCEDURE — 99214 OFFICE O/P EST MOD 30 MIN: CPT | Performed by: FAMILY MEDICINE

## 2023-12-27 RX ORDER — CEFUROXIME AXETIL 500 MG/1
500 TABLET ORAL 2 TIMES DAILY
COMMUNITY
Start: 2024-01-02 | End: 2023-12-27 | Stop reason: ALTCHOICE

## 2023-12-27 NOTE — PROGRESS NOTES
Subjective:     Hector Araujo Sr. is a 73 y.o. male who presents to the office today for a preoperative consultation at the request of surgeon Dr. Michelle who plans on performing cystoscopy with cystolitholopaxy on January 5. This consultation is requested for the specific conditions prompting preoperative evaluation (i.e. because of potential affect on operative risk): Coronary artery disease status post CABG. Planned anesthesia: general. The patient has the following known anesthesia issues:  no prior problems with endotracheal intubation or anesthesia. Patients bleeding risk: no recent abnormal bleeding.  The following portions of the patient's history were reviewed and updated as appropriate: He  has a past medical history of CAD (coronary artery disease).  He   Patient Active Problem List    Diagnosis Date Noted    Hx of CABG in 2001 09/24/2018    Hypercholesteremia 06/07/2016    ARMD (age related macular degeneration) 11/10/2014    CAD (coronary artery disease) 08/28/2013    Hypertension 08/12/2013     He  has a past surgical history that includes Coronary artery bypass graft (05/2001); Tonsillectomy; Adenoidectomy; Nasal septum surgery; Carpal tunnel release (Bilateral); and Lithotripsy (10/28/2020).  His family history includes Abdominal aortic aneurysm in his father; Lung cancer in his mother.  He  reports that he has never smoked. He has never used smokeless tobacco. He reports current alcohol use. He reports that he does not use drugs.  Current Outpatient Medications   Medication Sig Dispense Refill    aspirin 81 MG tablet Take by mouth every other day Three times weekly      carvedilol (COREG CR) 10 MG 24 hr capsule Take 1 capsule (10 mg total) by mouth 2 (two) times a day 180 capsule 2    ezetimibe (ZETIA) 10 mg tablet Take 1 tablet (10 mg total) by mouth daily 90 tablet 3    Multiple Vitamins-Minerals (PRESERVISION AREDS 2 PO) Take 1 capsule by mouth 2 (two) times a day      olmesartan (BENICAR)  40 mg tablet Take 1 tablet (40 mg total) by mouth daily 90 tablet 2    ranibizumab (Lucentis) 0.5 mg/0.05mL Administer 0.5 mg to both eyes every 30 (thirty) days      rosuvastatin (CRESTOR) 20 MG tablet Take 1 tablet (20 mg total) by mouth daily 90 tablet 2     No current facility-administered medications for this visit.     Current Outpatient Medications on File Prior to Visit   Medication Sig    aspirin 81 MG tablet Take by mouth every other day Three times weekly    carvedilol (COREG CR) 10 MG 24 hr capsule Take 1 capsule (10 mg total) by mouth 2 (two) times a day    ezetimibe (ZETIA) 10 mg tablet Take 1 tablet (10 mg total) by mouth daily    Multiple Vitamins-Minerals (PRESERVISION AREDS 2 PO) Take 1 capsule by mouth 2 (two) times a day    olmesartan (BENICAR) 40 mg tablet Take 1 tablet (40 mg total) by mouth daily    ranibizumab (Lucentis) 0.5 mg/0.05mL Administer 0.5 mg to both eyes every 30 (thirty) days    rosuvastatin (CRESTOR) 20 MG tablet Take 1 tablet (20 mg total) by mouth daily    [DISCONTINUED] brimonidine-timolol (Combigan) 0.2-0.5 % Administer 1 drop to the right eye every 12 (twelve) hours    [DISCONTINUED] cefuroxime (CEFTIN) 500 mg tablet Take 500 mg by mouth 2 (two) times a day Do not start before January 2, 2024. (Patient not taking: Reported on 12/27/2023 Do not start before January 2, 2024.)    [DISCONTINUED] celecoxib (CeleBREX) 200 mg capsule take 1 capsule by mouth daily for 3 days PRIOR TO SURGERY DO NOT ...  (REFER TO PRESCRIPTION NOTES). (Patient not taking: Reported on 6/29/2022)    [DISCONTINUED] Cephalexin 500 MG tablet TAKE 4 TABLETS BY MOUTH ONE HOUR PRIOR TO PROCEDURE    [DISCONTINUED] chlorhexidine (Hibiclens) 4 % external liquid Hibiclens 4 % topical liquid   Wash surgical site daily starting five days before surgery (Patient not taking: Reported on 6/29/2022)    [DISCONTINUED] Docosahexaenoic Acid (DHA OMEGA 3 PO) Take by mouth daily 1000 mg daily    [DISCONTINUED] Multiple  Vitamin (DAILY VALUE MULTIVITAMIN PO) Take 1 tablet by mouth daily    [DISCONTINUED] mupirocin (BACTROBAN) 2 % ointment APPLY A SMALL AMOUNT TO BOTH NOSTRILS TWICE DAILY FOR 5 DAYS PRIOR TO SURGERY (Patient not taking: Reported on 6/29/2022)     No current facility-administered medications on file prior to visit.     He has No Known Allergies..  Past Medical History:   Diagnosis Date    CAD (coronary artery disease)        Past Surgical History:   Procedure Laterality Date    ADENOIDECTOMY      CARPAL TUNNEL RELEASE Bilateral     CORONARY ARTERY BYPASS GRAFT  05/2001    x4    LITHOTRIPSY  10/28/2020    Dr Marcos Michelle    NASAL SEPTUM SURGERY      TONSILLECTOMY         Review of Systems  A comprehensive review of systems was negative except for: Cardiovascular: positive for status post CABG  Genitourinary: positive for ureteral stenosis and renal calculi     Objective:  Physical Exam  Constitutional:       General: He is not in acute distress.     Appearance: He is well-developed. He is not diaphoretic.   HENT:      Head: Normocephalic.      Right Ear: External ear normal.      Left Ear: External ear normal.      Nose: Nose normal.   Eyes:      General: No scleral icterus.        Right eye: No discharge.         Left eye: No discharge.      Conjunctiva/sclera: Conjunctivae normal.      Pupils: Pupils are equal, round, and reactive to light.   Neck:      Thyroid: No thyromegaly.      Trachea: No tracheal deviation.   Cardiovascular:      Rate and Rhythm: Normal rate and regular rhythm.      Heart sounds: Normal heart sounds. No murmur heard.     No friction rub. No gallop.   Pulmonary:      Effort: Pulmonary effort is normal. No respiratory distress.      Breath sounds: Normal breath sounds. No wheezing.   Abdominal:      General: Bowel sounds are normal.      Palpations: Abdomen is soft. There is no mass.      Tenderness: There is no abdominal tenderness. There is no guarding.   Musculoskeletal:         General:  No deformity.      Cervical back: Normal range of motion.   Lymphadenopathy:      Cervical: No cervical adenopathy.   Skin:     General: Skin is warm and dry.      Findings: No erythema or rash.   Neurological:      Mental Status: He is alert and oriented to person, place, and time.      Cranial Nerves: No cranial nerve deficit.   Psychiatric:         Thought Content: Thought content normal.         Cardiographics  ECG: no prior ECG  Echocardiogram: not done    Imaging  Chest x-ray:  no recent chest x-ray     Lab Review   No visits with results within 2 Month(s) from this visit.   Latest known visit with results is:   Appointment on 04/06/2023   Component Date Value    WBC 04/06/2023 6.38     RBC 04/06/2023 4.47     Hemoglobin 04/06/2023 13.7     Hematocrit 04/06/2023 41.9     MCV 04/06/2023 94     MCH 04/06/2023 30.6     MCHC 04/06/2023 32.7     RDW 04/06/2023 13.4     MPV 04/06/2023 9.8     Platelets 04/06/2023 356     nRBC 04/06/2023 0     Neutrophils Relative 04/06/2023 53     Immat GRANS % 04/06/2023 0     Lymphocytes Relative 04/06/2023 35     Monocytes Relative 04/06/2023 9     Eosinophils Relative 04/06/2023 2     Basophils Relative 04/06/2023 1     Neutrophils Absolute 04/06/2023 3.38     Immature Grans Absolute 04/06/2023 0.02     Lymphocytes Absolute 04/06/2023 2.22     Monocytes Absolute 04/06/2023 0.58     Eosinophils Absolute 04/06/2023 0.13     Basophils Absolute 04/06/2023 0.05     Sodium 04/06/2023 140     Potassium 04/06/2023 4.1     Chloride 04/06/2023 103     CO2 04/06/2023 28     ANION GAP 04/06/2023 9     BUN 04/06/2023 16     Creatinine 04/06/2023 1.04     Glucose, Fasting 04/06/2023 128 (H)     Calcium 04/06/2023 9.4     AST 04/06/2023 20     ALT 04/06/2023 22     Alkaline Phosphatase 04/06/2023 93     Total Protein 04/06/2023 6.9     Albumin 04/06/2023 4.0     Total Bilirubin 04/06/2023 1.06 (H)     eGFR 04/06/2023 71         Assessment:     73 y.o. male with planned surgery as above.     Known risk factors for perioperative complications: None  Renal calculi     Difficulty with intubation is not anticipated.    Cardiac Risk Estimation: Minimal    Current medications which may produce withdrawal symptoms if withheld perioperatively: none     Plan:  Patient is CLEARED for surgery without any additional cardiac testing.   1. Preoperative workup as follows preop lab has been reviewed and is acceptable.  2. Change in medication regimen before surgery: none, continue medication regimen including morning of surgery, with sip of water.  3. Prophylaxis for cardiac events with perioperative beta-blockers: None.  4. Invasive hemodynamic monitoring perioperatively: not indicated.  5. Deep vein thrombosis prophylaxis postoperatively:regimen to be chosen by surgical team.  6. Other measures:

## 2024-02-06 ENCOUNTER — VBI (OUTPATIENT)
Dept: ADMINISTRATIVE | Facility: OTHER | Age: 74
End: 2024-02-06

## 2024-02-07 ENCOUNTER — OFFICE VISIT (OUTPATIENT)
Dept: FAMILY MEDICINE CLINIC | Facility: CLINIC | Age: 74
End: 2024-02-07
Payer: COMMERCIAL

## 2024-02-07 VITALS
HEIGHT: 73 IN | SYSTOLIC BLOOD PRESSURE: 146 MMHG | OXYGEN SATURATION: 97 % | DIASTOLIC BLOOD PRESSURE: 84 MMHG | BODY MASS INDEX: 35.78 KG/M2 | TEMPERATURE: 96.9 F | HEART RATE: 68 BPM | WEIGHT: 270 LBS

## 2024-02-07 DIAGNOSIS — Z20.822 EXPOSURE TO COVID-19 VIRUS: ICD-10-CM

## 2024-02-07 DIAGNOSIS — B34.9 VIRAL INFECTION, UNSPECIFIED: ICD-10-CM

## 2024-02-07 DIAGNOSIS — J06.9 VIRAL UPPER RESPIRATORY TRACT INFECTION: Primary | ICD-10-CM

## 2024-02-07 PROCEDURE — 99214 OFFICE O/P EST MOD 30 MIN: CPT | Performed by: FAMILY MEDICINE

## 2024-02-07 PROCEDURE — 87636 SARSCOV2 & INF A&B AMP PRB: CPT | Performed by: FAMILY MEDICINE

## 2024-02-07 NOTE — PROGRESS NOTES
Assessment/Plan: Swab has been obtained patient will be treated according to the results of the test    Problem List Items Addressed This Visit    None  Visit Diagnoses       Viral upper respiratory tract infection    -  Primary             Diagnoses and all orders for this visit:    Viral upper respiratory tract infection        No problem-specific Assessment & Plan notes found for this encounter.        Subjective:      Patient ID: Hector Araujo Sr. is a 73 y.o. male.    Viral infection     Cough  Associated symptoms include a sore throat.       The following portions of the patient's history were reviewed and updated as appropriate:   He has a past medical history of CAD (coronary artery disease).,  does not have any pertinent problems on file.,   has a past surgical history that includes Coronary artery bypass graft (05/2001); Tonsillectomy; Adenoidectomy; Nasal septum surgery; Carpal tunnel release (Bilateral); and Lithotripsy (10/28/2020).,  family history includes Abdominal aortic aneurysm in his father; Lung cancer in his mother.,   reports that he has never smoked. He has never used smokeless tobacco. He reports current alcohol use. He reports that he does not use drugs.,  has No Known Allergies..  Current Outpatient Medications   Medication Sig Dispense Refill    aspirin 81 MG tablet Take by mouth every other day Three times weekly      carvedilol (COREG CR) 10 MG 24 hr capsule Take 1 capsule (10 mg total) by mouth 2 (two) times a day 180 capsule 2    ezetimibe (ZETIA) 10 mg tablet Take 1 tablet (10 mg total) by mouth daily 90 tablet 3    Multiple Vitamins-Minerals (PRESERVISION AREDS 2 PO) Take 1 capsule by mouth 2 (two) times a day      olmesartan (BENICAR) 40 mg tablet Take 1 tablet (40 mg total) by mouth daily 90 tablet 2    ranibizumab (Lucentis) 0.5 mg/0.05mL Administer 0.5 mg to both eyes every 30 (thirty) days      rosuvastatin (CRESTOR) 20 MG tablet Take 1 tablet (20 mg total) by mouth daily  "90 tablet 2     No current facility-administered medications for this visit.       Review of Systems   HENT:  Positive for sinus pressure, sinus pain and sore throat.    Respiratory:  Positive for cough.          Objective:  Vitals:    02/07/24 1208   BP: 146/84   BP Location: Left arm   Patient Position: Sitting   Cuff Size: Standard   Pulse: 68   Temp: (!) 96.9 °F (36.1 °C)   TempSrc: Temporal   SpO2: 97%   Weight: 122 kg (270 lb)   Height: 6' 1\" (1.854 m)     Body mass index is 35.62 kg/m².     Physical Exam  Constitutional:       Appearance: He is obese. He is ill-appearing.   HENT:      Nose: Congestion and rhinorrhea present.   Pulmonary:      Breath sounds: Wheezing and rhonchi present.   Neurological:      Mental Status: He is alert.         "

## 2024-02-08 DIAGNOSIS — U07.1 COVID-19: Primary | ICD-10-CM

## 2024-02-08 LAB
FLUAV RNA RESP QL NAA+PROBE: NEGATIVE
FLUBV RNA RESP QL NAA+PROBE: NEGATIVE
SARS-COV-2 RNA RESP QL NAA+PROBE: POSITIVE

## 2024-02-08 RX ORDER — NIRMATRELVIR AND RITONAVIR 300-100 MG
3 KIT ORAL 2 TIMES DAILY
Qty: 30 TABLET | Refills: 0 | Status: SHIPPED | OUTPATIENT
Start: 2024-02-08 | End: 2024-02-13

## 2025-03-31 ENCOUNTER — TELEPHONE (OUTPATIENT)
Age: 75
End: 2025-03-31

## 2025-03-31 NOTE — TELEPHONE ENCOUNTER
Spouse called and stated patient saw his cardiologist and they recommended patient discuss his 7.0 A1C with Dr. Owens to see if there are any recommendations.     I scheduled patient for an over due AWV for 4/7. Spouse and patient are requesting recommendations before the apt.     Please advise  Thank you

## 2025-04-03 ENCOUNTER — TELEPHONE (OUTPATIENT)
Age: 75
End: 2025-04-03

## 2025-04-03 DIAGNOSIS — E11.69 TYPE 2 DIABETES MELLITUS WITH OTHER SPECIFIED COMPLICATION, WITHOUT LONG-TERM CURRENT USE OF INSULIN (HCC): Primary | ICD-10-CM

## 2025-04-03 NOTE — TELEPHONE ENCOUNTER
Continuation- Note from  3/31/25 states patient should be seen in about 6-8weeks but referral was put in as ASAP. Please avdise

## 2025-04-03 NOTE — TELEPHONE ENCOUNTER
LVM for PT to schedule consult with . Soonest available is okay per provider. Provided office number and time frame of soonest available (Mid May 2025) for PT in message.

## 2025-04-03 NOTE — TELEPHONE ENCOUNTER
There is an ASAP referral in the chart and there are no openings within the appropriate time frame. Can you please check with the provider and call the patient?

## 2025-04-07 ENCOUNTER — OFFICE VISIT (OUTPATIENT)
Dept: FAMILY MEDICINE CLINIC | Facility: CLINIC | Age: 75
End: 2025-04-07
Payer: COMMERCIAL

## 2025-04-07 VITALS
BODY MASS INDEX: 34.99 KG/M2 | DIASTOLIC BLOOD PRESSURE: 80 MMHG | OXYGEN SATURATION: 96 % | HEIGHT: 73 IN | SYSTOLIC BLOOD PRESSURE: 114 MMHG | HEART RATE: 89 BPM | TEMPERATURE: 98 F | WEIGHT: 264 LBS

## 2025-04-07 DIAGNOSIS — E11.9 TYPE 2 DIABETES MELLITUS WITHOUT COMPLICATION, WITHOUT LONG-TERM CURRENT USE OF INSULIN (HCC): Primary | ICD-10-CM

## 2025-04-07 DIAGNOSIS — Z00.00 MEDICARE ANNUAL WELLNESS VISIT, SUBSEQUENT: ICD-10-CM

## 2025-04-07 DIAGNOSIS — R63.5 WEIGHT GAIN, ABNORMAL: ICD-10-CM

## 2025-04-07 PROCEDURE — G0439 PPPS, SUBSEQ VISIT: HCPCS | Performed by: FAMILY MEDICINE

## 2025-04-07 RX ORDER — CARVEDILOL 6.25 MG/1
6.25 TABLET ORAL 2 TIMES DAILY WITH MEALS
COMMUNITY
Start: 2025-02-11

## 2025-04-07 RX ORDER — ERYTHROMYCIN 5 MG/G
OINTMENT OPHTHALMIC
COMMUNITY
Start: 2025-03-03 | End: 2025-04-07 | Stop reason: ALTCHOICE

## 2025-04-07 NOTE — PROGRESS NOTES
Name: Hector Araujo Sr.      : 1950      MRN: [id number]  Encounter Provider: Jaison Owens DO  Encounter Date: 2025   Encounter department: [facility]  :  Assessment & Plan       Preventive health issues were discussed with patient, and age appropriate screening tests were ordered as noted in patient's After Visit Summary. Personalized health advice and appropriate referrals for health education or preventive services given if needed, as noted in patient's After Visit Summary.    History of Present Illness     New onset type 2 diabets  here for medicare well visit       Patient Care Team:  Jaison Owens DO as PCP - General (Family Medicine)    Review of Systems   Constitutional:  Positive for activity change, fatigue and unexpected weight change. Negative for appetite change, diaphoresis and fever.   HENT: Negative.  Negative for dental problem and hearing loss.    Eyes: Negative.  Negative for visual disturbance.   Respiratory:  Negative for apnea, cough, chest tightness, shortness of breath and wheezing.    Cardiovascular:  Negative for chest pain, palpitations and leg swelling.   Gastrointestinal:  Negative for abdominal distention, abdominal pain, anal bleeding, constipation, diarrhea, nausea and vomiting.   Endocrine: Negative for cold intolerance, heat intolerance, polydipsia, polyphagia and polyuria.   Genitourinary:  Negative for difficulty urinating, dysuria, flank pain, hematuria and urgency.   Musculoskeletal:  Negative for arthralgias, back pain, gait problem, joint swelling and myalgias.   Skin:  Negative for color change, rash and wound.   Allergic/Immunologic: Negative for environmental allergies, food allergies and immunocompromised state.   Neurological:  Negative for dizziness, seizures, syncope, speech difficulty, numbness and headaches.   Hematological:  Negative for adenopathy. Does not bruise/bleed easily.   Psychiatric/Behavioral:  Negative for agitation,  behavioral problems, hallucinations, sleep disturbance and suicidal ideas.      Medical History Reviewed by provider this encounter:       Annual Wellness Visit Questionnaire   Hector is here for his Subsequent Wellness visit.     Health Risk Assessment:   Patient rates overall health as very good. Patient feels that their physical health rating is same. Patient is very satisfied with their life. Eyesight was rated as same. Hearing was rated as same. Patient feels that their emotional and mental health rating is same. Patients states they are never, rarely angry. Patient states they are never, rarely unusually tired/fatigued. Pain experienced in the last 7 days has been none. Patient states that he has experienced no weight loss or gain in last 6 months.     Depression Screening:   PHQ-2 Score: 0      Fall Risk Screening:   In the past year, patient has experienced: no history of falling in past year      Home Safety:  Patient does not have trouble with stairs inside or outside of their home. Patient has working smoke alarms and has working carbon monoxide detector. Home safety hazards include: none.     Nutrition:   Current diet is Diabetic.     Medications:   Patient is not currently taking any over-the-counter supplements. Patient is able to manage medications.     Activities of Daily Living (ADLs)/Instrumental Activities of Daily Living (IADLs):   Walk and transfer into and out of bed and chair?: Yes  Dress and groom yourself?: Yes    Bathe or shower yourself?: Yes    Feed yourself? Yes  Do your laundry/housekeeping?: Yes  Manage your money, pay your bills and track your expenses?: Yes  Make your own meals?: Yes    Do your own shopping?: Yes    Previous Hospitalizations:   Any hospitalizations or ED visits within the last 12 months?: No      Advance Care Planning:   Living will: Yes    Durable POA for healthcare: Yes    Advanced directive: Yes    Advanced directive counseling given: No    Five wishes given: No     Patient declined ACP directive: No    End of Life Decisions reviewed with patient: Yes    Provider agrees with end of life decisions: Yes      Cognitive Screening:   Provider or family/friend/caregiver concerned regarding cognition?: No    PREVENTIVE SCREENINGS      Cardiovascular Screening:    General: Screening Not Indicated and History Lipid Disorder      Diabetes Screening:     General: Screening Not Indicated and History Diabetes      Colorectal Cancer Screening:     General: Patient Declines      Prostate Cancer Screening:    General: Screening Not Indicated      Osteoporosis Screening:    General: Screening Not Indicated      Abdominal Aortic Aneurysm (AAA) Screening:    Risk factors include: family history of AAA and age between 65-76 yo        Lung Cancer Screening:     General: Screening Not Indicated      Hepatitis C Screening:    General: Patient Declines    Screening, Brief Intervention, and Referral to Treatment (SBIRT)     Screening  Typical number of drinks in a day: 0  Typical number of drinks in a week: 0  Interpretation: Low risk drinking behavior.    AUDIT-C Screenin) How often did you have a drink containing alcohol in the past year? never  2) How many drinks did you have on a typical day when you were drinking in the past year? 0  3) How often did you have 6 or more drinks on one occasion in the past year? never    AUDIT-C Score: 0  Interpretation: Score 0-3 (male): Negative screen for alcohol misuse    Single Item Drug Screening:  How often have you used an illegal drug (including marijuana) or a prescription medication for non-medical reasons in the past year? never    Single Item Drug Screen Score: 0  Interpretation: Negative screen for possible drug use disorder    Social Drivers of Health     Financial Resource Strain: Low Risk  (2024)    Received from [facility] [address]    Overall Financial Resource Strain (CARDIA)     Difficulty of Paying Living Expenses: Not very  "hard   Food Insecurity: No Food Insecurity (4/3/2025)    Hunger Vital Sign     Worried About Running Out of Food in the Last Year: Never true     Ran Out of Food in the Last Year: Never true   Transportation Needs: No Transportation Needs (4/3/2025)    PRAPARE - Transportation     Lack of Transportation (Medical): No     Lack of Transportation (Non-Medical): No   Housing Stability: Low Risk  (4/3/2025)    Housing Stability Vital Sign     Unable to Pay for Housing in the Last Year: No     Number of Times Moved in the Last Year: 0     Homeless in the Last Year: No   Utilities: Not At Risk (4/3/2025)    [facility] Utilities     Threatened with loss of utilities: No     No results found.    Objective   /80 (BP Location: Left arm, Patient Position: Sitting, Cuff Size: Large)   Pulse 89   Temp 98 °F (36.7 °C) (Temporal)   Ht 6' 1\" (1.854 m)   Wt 120 kg (264 lb)   SpO2 96%   BMI 34.83 kg/m²     Physical Exam  Constitutional:       Appearance: He is well-developed.   HENT:      Head: Normocephalic and atraumatic.      Right Ear: External ear normal.      Left Ear: External ear normal.      Nose: Nose normal.   Eyes:      Conjunctiva/sclera: Conjunctivae normal.      Pupils: Pupils are equal, round, and reactive to light.   Cardiovascular:      Rate and Rhythm: Normal rate and regular rhythm.      Heart sounds: Normal heart sounds. No murmur heard.     No friction rub.   Pulmonary:      Effort: Pulmonary effort is normal. No respiratory distress.      Breath sounds: Normal breath sounds. No wheezing or rales.   Chest:      Chest wall: No tenderness.   Abdominal:      General: Bowel sounds are normal.      Palpations: Abdomen is soft.   Musculoskeletal:         General: Normal range of motion.      Cervical back: Normal range of motion and neck supple.   Skin:     General: Skin is warm and dry.      Capillary Refill: Capillary refill takes 2 to 3 seconds.   Neurological:      Mental Status: He is alert and oriented to " person, place, and time.   Psychiatric:         Behavior: Behavior normal.         Thought Content: Thought content normal.         Judgment: Judgment normal.

## 2025-04-07 NOTE — PATIENT INSTRUCTIONS
Medicare Preventive Visit Patient Instructions  Thank you for completing your Welcome to Medicare Visit or Medicare Annual Wellness Visit today. Your next wellness visit will be due in one year (4/8/2026).  The screening/preventive services that you may require over the next 5-10 years are detailed below. Some tests may not apply to you based off risk factors and/or age. Screening tests ordered at today's visit but not completed yet may show as past due. Also, please note that scanned in results may not display below.  Preventive Screenings:  Service Recommendations Previous Testing/Comments   Colorectal Cancer Screening  Colonoscopy    Fecal Occult Blood Test (FOBT)/Fecal Immunochemical Test (FIT)  Fecal DNA/Cologuard Test  Flexible Sigmoidoscopy Age: 45-75 years old   Colonoscopy: every 10 years (May be performed more frequently if at higher risk)  OR  FOBT/FIT: every 1 year  OR  Cologuard: every 3 years  OR  Sigmoidoscopy: every 5 years  Screening may be recommended earlier than age 45 if at higher risk for colorectal cancer. Also, an individualized decision between you and your healthcare provider will decide whether screening between the ages of 76-85 would be appropriate. Colonoscopy: Not on file  FOBT/FIT: Not on file  Cologuard: Not on file  Sigmoidoscopy: Not on file    Patient Declines     Prostate Cancer Screening Individualized decision between patient and health care provider in men between ages of 55-69   Medicare will cover every 12 months beginning on the day after your 50th birthday PSA: No results in last 5 years     Screening Not Indicated     Hepatitis C Screening Once for adults born between 1945 and 1965  More frequently in patients at high risk for Hepatitis C Hep C Antibody: Not on file    Patient Declines   Diabetes Screening 1-2 times per year if you're at risk for diabetes or have pre-diabetes Fasting glucose: 128 mg/dL (4/6/2023)  A1C: 7 % (3/29/2025)  Screening Not Indicated  History  Diabetes   Cholesterol Screening Once every 5 years if you don't have a lipid disorder. May order more often based on risk factors. Lipid panel: 03/29/2025  Screening Not Indicated  History Lipid Disorder      Other Preventive Screenings Covered by Medicare:  Abdominal Aortic Aneurysm (AAA) Screening: covered once if your at risk. You're considered to be at risk if you have a family history of AAA or a male between the age of 65-75 who smoking at least 100 cigarettes in your lifetime.  Lung Cancer Screening: covers low dose CT scan once per year if you meet all of the following conditions: (1) Age 55-77; (2) No signs or symptoms of lung cancer; (3) Current smoker or have quit smoking within the last 15 years; (4) You have a tobacco smoking history of at least 20 pack years (packs per day x number of years you smoked); (5) You get a written order from a healthcare provider.  Glaucoma Screening: covered annually if you're considered high risk: (1) You have diabetes OR (2) Family history of glaucoma OR (3)  aged 50 and older OR (4)  American aged 65 and older  Osteoporosis Screening: covered every 2 years if you meet one of the following conditions: (1) Have a vertebral abnormality; (2) On glucocorticoid therapy for more than 3 months; (3) Have primary hyperparathyroidism; (4) On osteoporosis medications and need to assess response to drug therapy.  HIV Screening: covered annually if you're between the age of 15-65. Also covered annually if you are younger than 15 and older than 65 with risk factors for HIV infection. For pregnant patients, it is covered up to 3 times per pregnancy.    Immunizations:  Immunization Recommendations   Influenza Vaccine Annual influenza vaccination during flu season is recommended for all persons aged >= 6 months who do not have contraindications   Pneumococcal Vaccine   * Pneumococcal conjugate vaccine = PCV13 (Prevnar 13), PCV15 (Vaxneuvance), PCV20 (Prevnar  20)  * Pneumococcal polysaccharide vaccine = PPSV23 (Pneumovax) Adults 19-63 yo with certain risk factors or if 65+ yo  If never received any pneumonia vaccine: recommend Prevnar 20 (PCV20)  Give PCV20 if previously received 1 dose of PCV13 or PPSV23   Hepatitis B Vaccine 3 dose series if at intermediate or high risk (ex: diabetes, end stage renal disease, liver disease)   Respiratory syncytial virus (RSV) Vaccine - COVERED BY MEDICARE PART D  * RSVPreF3 (Arexvy) CDC recommends that adults 60 years of age and older may receive a single dose of RSV vaccine using shared clinical decision-making (SCDM)   Tetanus (Td) Vaccine - COST NOT COVERED BY MEDICARE PART B Following completion of primary series, a booster dose should be given every 10 years to maintain immunity against tetanus. Td may also be given as tetanus wound prophylaxis.   Tdap Vaccine - COST NOT COVERED BY MEDICARE PART B Recommended at least once for all adults. For pregnant patients, recommended with each pregnancy.   Shingles Vaccine (Shingrix) - COST NOT COVERED BY MEDICARE PART B  2 shot series recommended in those 19 years and older who have or will have weakened immune systems or those 50 years and older     Health Maintenance Due:      Topic Date Due   • Hepatitis C Screening  Never done   • Colorectal Cancer Screening  Never done     Immunizations Due:      Topic Date Due   • COVID-19 Vaccine (9 - 2024-25 season) 03/18/2025     Advance Directives   What are advance directives?  Advance directives are legal documents that state your wishes and plans for medical care. These plans are made ahead of time in case you lose your ability to make decisions for yourself. Advance directives can apply to any medical decision, such as the treatments you want, and if you want to donate organs.   What are the types of advance directives?  There are many types of advance directives, and each state has rules about how to use them. You may choose a combination of  any of the following:  Living will:  This is a written record of the treatment you want. You can also choose which treatments you do not want, which to limit, and which to stop at a certain time. This includes surgery, medicine, IV fluid, and tube feedings.   Durable power of  for healthcare (DPAHC):  This is a written record that states who you want to make healthcare choices for you when you are unable to make them for yourself. This person, called a proxy, is usually a family member or a friend. You may choose more than 1 proxy.  Do not resuscitate (DNR) order:  A DNR order is used in case your heart stops beating or you stop breathing. It is a request not to have certain forms of treatment, such as CPR. A DNR order may be included in other types of advance directives.  Medical directive:  This covers the care that you want if you are in a coma, near death, or unable to make decisions for yourself. You can list the treatments you want for each condition. Treatment may include pain medicine, surgery, blood transfusions, dialysis, IV or tube feedings, and a ventilator (breathing machine).  Values history:  This document has questions about your views, beliefs, and how you feel and think about life. This information can help others choose the care that you would choose.  Why are advance directives important?  An advance directive helps you control your care. Although spoken wishes may be used, it is better to have your wishes written down. Spoken wishes can be misunderstood, or not followed. Treatments may be given even if you do not want them. An advance directive may make it easier for your family to make difficult choices about your care.   Weight Management   Why it is important to manage your weight:  Being overweight increases your risk of health conditions such as heart disease, high blood pressure, type 2 diabetes, and certain types of cancer. It can also increase your risk for osteoarthritis, sleep  apnea, and other respiratory problems. Aim for a slow, steady weight loss. Even a small amount of weight loss can lower your risk of health problems.  How to lose weight safely:  A safe and healthy way to lose weight is to eat fewer calories and get regular exercise. You can lose up about 1 pound a week by decreasing the number of calories you eat by 500 calories each day.   Healthy meal plan for weight management:  A healthy meal plan includes a variety of foods, contains fewer calories, and helps you stay healthy. A healthy meal plan includes the following:  Eat whole-grain foods more often.  A healthy meal plan should contain fiber. Fiber is the part of grains, fruits, and vegetables that is not broken down by your body. Whole-grain foods are healthy and provide extra fiber in your diet. Some examples of whole-grain foods are whole-wheat breads and pastas, oatmeal, brown rice, and bulgur.  Eat a variety of vegetables every day.  Include dark, leafy greens such as spinach, kale, don greens, and mustard greens. Eat yellow and orange vegetables such as carrots, sweet potatoes, and winter squash.   Eat a variety of fruits every day.  Choose fresh or canned fruit (canned in its own juice or light syrup) instead of juice. Fruit juice has very little or no fiber.  Eat low-fat dairy foods.  Drink fat-free (skim) milk or 1% milk. Eat fat-free yogurt and low-fat cottage cheese. Try low-fat cheeses such as mozzarella and other reduced-fat cheeses.  Choose meat and other protein foods that are low in fat.  Choose beans or other legumes such as split peas or lentils. Choose fish, skinless poultry (chicken or turkey), or lean cuts of red meat (beef or pork). Before you cook meat or poultry, cut off any visible fat.   Use less fat and oil.  Try baking foods instead of frying them. Add less fat, such as margarine, sour cream, regular salad dressing and mayonnaise to foods. Eat fewer high-fat foods. Some examples of high-fat  foods include french fries, doughnuts, ice cream, and cakes.  Eat fewer sweets.  Limit foods and drinks that are high in sugar. This includes candy, cookies, regular soda, and sweetened drinks.  Exercise:  Exercise at least 30 minutes per day on most days of the week. Some examples of exercise include walking, biking, dancing, and swimming. You can also fit in more physical activity by taking the stairs instead of the elevator or parking farther away from stores. Ask your healthcare provider about the best exercise plan for you.      © Copyright [employer] 2018 Information is for End User's use only and may not be sold, redistributed or otherwise used for commercial purposes. All illustrations and images included in CareNotes® are the copyrighted property of [employer] or [employer]

## 2025-04-16 ENCOUNTER — APPOINTMENT (OUTPATIENT)
Dept: LAB | Facility: MEDICAL CENTER | Age: 75
End: 2025-04-16
Attending: FAMILY MEDICINE
Payer: COMMERCIAL

## 2025-04-16 DIAGNOSIS — R63.5 WEIGHT GAIN, ABNORMAL: ICD-10-CM

## 2025-04-16 LAB
CREAT UR-MCNC: 101.3 MG/DL
MICROALBUMIN UR-MCNC: <7 MG/L
TSH SERPL DL<=0.05 MIU/L-ACNC: 2.23 UIU/ML (ref 0.45–4.5)

## 2025-04-16 PROCEDURE — 82043 UR ALBUMIN QUANTITATIVE: CPT | Performed by: FAMILY MEDICINE

## 2025-04-16 PROCEDURE — 82570 ASSAY OF URINE CREATININE: CPT | Performed by: FAMILY MEDICINE

## 2025-04-16 PROCEDURE — 36415 COLL VENOUS BLD VENIPUNCTURE: CPT

## 2025-04-16 PROCEDURE — 84443 ASSAY THYROID STIM HORMONE: CPT

## 2025-04-17 ENCOUNTER — RESULTS FOLLOW-UP (OUTPATIENT)
Dept: FAMILY MEDICINE CLINIC | Facility: CLINIC | Age: 75
End: 2025-04-17

## 2025-04-17 NOTE — RESULT ENCOUNTER NOTE
Call patient to notify normal results urine sample here is normal there is no protein spillage in the urine which would indicate good kidney function

## 2025-05-13 ENCOUNTER — CONSULT (OUTPATIENT)
Dept: ENDOCRINOLOGY | Facility: CLINIC | Age: 75
End: 2025-05-13
Payer: COMMERCIAL

## 2025-05-13 VITALS
TEMPERATURE: 98.6 F | DIASTOLIC BLOOD PRESSURE: 78 MMHG | BODY MASS INDEX: 33.8 KG/M2 | WEIGHT: 255 LBS | OXYGEN SATURATION: 96 % | HEIGHT: 73 IN | SYSTOLIC BLOOD PRESSURE: 138 MMHG | HEART RATE: 78 BPM

## 2025-05-13 DIAGNOSIS — I25.10 CORONARY ARTERY DISEASE DUE TO LIPID RICH PLAQUE: ICD-10-CM

## 2025-05-13 DIAGNOSIS — E11.69 TYPE 2 DIABETES MELLITUS WITH OTHER SPECIFIED COMPLICATION, WITHOUT LONG-TERM CURRENT USE OF INSULIN (HCC): Primary | ICD-10-CM

## 2025-05-13 DIAGNOSIS — N21.0 BLADDER STONES: ICD-10-CM

## 2025-05-13 DIAGNOSIS — H35.30 ARMD (AGE RELATED MACULAR DEGENERATION): ICD-10-CM

## 2025-05-13 DIAGNOSIS — I25.83 CORONARY ARTERY DISEASE DUE TO LIPID RICH PLAQUE: ICD-10-CM

## 2025-05-13 PROBLEM — E11.9 DIABETES MELLITUS (HCC): Status: ACTIVE | Noted: 2025-05-13

## 2025-05-13 PROCEDURE — 99204 OFFICE O/P NEW MOD 45 MIN: CPT | Performed by: STUDENT IN AN ORGANIZED HEALTH CARE EDUCATION/TRAINING PROGRAM

## 2025-05-13 NOTE — ASSESSMENT & PLAN NOTE
Receiving regular injections since 2017, last injection approximately 6 weeks ago. Vision stable at 20/15 and 20/25. Will continue follow-ups with Dr. Glass for ongoing management and monitoring.

## 2025-05-13 NOTE — PROGRESS NOTES
Name: Hector Araujo Sr.      : 1950      MRN: [id number]  Encounter Provider: Brooks Puente,   Encounter Date: 2025   Encounter department: [facility]    No chief complaint on file.  :  Assessment & Plan  Type 2 diabetes mellitus with other specified complication, without long-term current use of insulin (HCC)  Recent diagnosis based on an A1c of 7%. GFR is within normal limits at 85, no evidence of nephropathy or neuropathy. He had a retinal exam which was in normal limits. Advised to continue current lifestyle modifications, including a low-oxalate diet to prevent bladder spasms. Referral to a nutritionist will be made to assist in developing a suitable diet plan considering other health conditions. We will plan reassessment in 3-months    Lab Results   Component Value Date    HGBA1C 7 (H) 2025       Orders:  •  Ambulatory Referral to Endocrinology  •  Basic metabolic panel; Future  •  Hemoglobin A1C; Future  •  Ambulatory referral to Diabetic Education; Future    Coronary artery disease due to lipid rich plaque  Continue statin       ARMD (age related macular degeneration)  Receiving regular injections since , last injection approximately 6 weeks ago. Vision stable at 20/15 and 20/25. Will continue follow-ups with Dr. Glass for ongoing management and monitoring.         Bladder stones  History of recurrent bladder stones, has undergone cystoscopy and active ablation. Advised to maintain a low-oxalate diet to prevent recurrence and to stay hydrated. Will continue follow-ups with urologist Dr. Michelle for ongoing management.           History of Present Illness   History of Present Illness  The patient is a 74-year-old male who presents today for a consultation regarding diabetes. This is a recent diagnosis based on an A1c of 7%. He is accompanied by his wife.    His cardiologist, Dr. Watson, conducted a blood test a few months  prior, revealing fluctuating glucose levels. The potential impact of Crestor on his glucose levels was also discussed. Despite no family history of diabetes, he acknowledges a diet high in pizza and other foods. Upon learning of his A1c level of 7%, he independently initiated dietary modifications, including the elimination of white bread. He has not consulted a nutritionist. He maintains an active lifestyle, attending the gym 5 to 6 days a week and engaging in various exercises, including daily treadmill use for 2 miles. Since April 2025, he has lost approximately 20 pounds, reducing his weight from 272 to 255 pounds. He reports feeling well and remains active, including yard work. He has been on Crestor 20 mg for an extended period, with the dosage reduced from 40 mg approximately 15 to 18 years ago. He has not experienced any cardiac issues since his initial event 24 years ago. He underwent open heart surgery at the age of 50 and has been under the care of Dr. Watson for his cardiovascular history.    He has a history of macular degeneration, which progressed from dry to wet, and has been receiving injections since 2017. His vision is currently 20/15 and 20/25. He is approximately 6 weeks post-injection and reports feeling well. He receives injections every 6 weeks and undergoes regular check-ups with Dr. Glass.    He has a history of bladder stones and recurrent urinary tract infections. He was previously treated with medication, but due to recurrent infections, he was referred to a urologist, Dr. Michelle. A cystoscopy revealed bladder stones, which were removed. However, he continued to experience bladder infections and underwent an Aqua ablation procedure, which was successful.    PAST SURGICAL HISTORY: He has had knee replacements, left 3 years ago and right 2 years ago. He had cataract surgery and lens implants for far and close vision. He underwent open heart surgery at the age of 50.    FAMILY  "HISTORY  He reports no family history of diabetes or cardiac disease.          Last Eye Exam: Not on file  Last Foot Exam: Not on file  Health Maintenance   Topic Date Due   • Diabetic Foot Exam  Never done   • Diabetic Eye Exam  05/23/2017     Pertinent Medical History     CAD  Bladder stones  Macular degeneration         Review of Systems as per [facility]         Medical History Reviewed by provider this encounter:  Tobacco  Allergies  Meds  Problems  Med Hx  Surg Hx  Fam Hx     .    Objective   /78 (BP Location: Left arm, Patient Position: Sitting)   Pulse 78   Temp 98.6 °F (37 °C)   Ht 6' 1\" (1.854 m)   Wt 116 kg (255 lb)   SpO2 96%   BMI 33.64 kg/m²      Body mass index is 33.64 kg/m².  Wt Readings from Last 3 Encounters:   05/13/25 116 kg (255 lb)   04/07/25 120 kg (264 lb)   02/07/24 122 kg (270 lb)     Physical Exam      Labs:   Lab Results   Component Value Date    HGBA1C 7 (H) 03/29/2025    HGBA1C 5.3 08/14/2015     Lab Results   Component Value Date    CREATININE 0.96 03/29/2025    CREATININE 0.94 05/02/2024    CREATININE 0.92 03/23/2024    BUN 16 03/29/2025     08/14/2015    K 4.4 03/29/2025     03/29/2025    CO2 28 03/29/2025     GFR, Calculated   Date Value Ref Range Status   09/12/2020 76 >60 mL/min/1.73m2 Final     Comment:     mL/min per 1.73 square meters                                            Normal Function or Mild Renal    Disease (if clinically at risk):  >or=60  Moderately Decreased:                30-59  Severely Decreased:                  15-29  Renal Failure:                         <15                                            -American GFR: multiply reported GFR by 1.16    Please note that the eGFR is based on the CKD-EPI calculation, and is not intended to be used for drug dosing.                                            Note: Calculated GFR may not be an accurate indicator of renal function if the patient's renal function is not in a steady state. "     eGFRcr   Date Value Ref Range Status   03/29/2025 83 >59 Final     Lab Results   Component Value Date    CHOL 124 08/14/2015    HDL 44 10/08/2021    TRIG 138 10/08/2021     Lab Results   Component Value Date    ALT 24 03/29/2025    AST 25 03/29/2025    ALKPHOS 84 03/29/2025    BILITOT 1.36 (H) 08/14/2015     Lab Results   Component Value Date    [id number] 2.231 04/16/2025       There are no Patient Instructions on file for this visit.    Discussed with the patient and all questioned fully answered. He will call me if any problems arise.

## 2025-05-13 NOTE — ASSESSMENT & PLAN NOTE
Recent diagnosis based on an A1c of 7%. GFR is within normal limits at 85, no evidence of nephropathy or neuropathy. He had a retinal exam which was in normal limits. Advised to continue current lifestyle modifications, including a low-oxalate diet to prevent bladder spasms. Referral to a nutritionist will be made to assist in developing a suitable diet plan considering other health conditions. We will plan reassessment in 3-months    Lab Results   Component Value Date    HGBA1C 7 (H) 03/29/2025       Orders:  •  Ambulatory Referral to Endocrinology  •  Basic metabolic panel; Future  •  Hemoglobin A1C; Future  •  Ambulatory referral to Diabetic Education; Future

## 2025-06-17 NOTE — PROGRESS NOTES
Medical Nutrition Therapy        Assessment    Visit Type: Initial visit  Chief complaint/Medical Diagnosis/reason for visit E11.69    HPI Hector was seen in person for the initial MNT appointment, accompanied by his wife, Martita. Patient was pleasant and willing to share assessment information. BG levels are not checked at this time. No diabetes medication to review. Hector aims to manage his diabetes through diet and exercise.    Patient reported a current exercise regimen of utilizing the gym for cardio and resistance training. Discussed improved insulin sensitivity with exercise. Noted 10 lbs intentional weight loss in about 2 month time duration.    Food-wise we discussed a low-oxalate diet, since Hector stated he would like to reduce the risk of stones. The current food intake appears to be very healthy. Hector used to enjoy pizza, but has not had that in a while.  Martita cares for her  participates in food shopping and preparation.  They read food labels for saturated fats.    Problems identified in food recall include inconsistent carbohydrate intake and some imbalanced meals. Consumption of carbohydrates ranges from 0 to ~65 grams per meal. Explained basic pathophysiology of diabetes and impact of diet on blood glucose levels and disease complications. Explained how sodium influences volume retention, blood pressure, and complications for heart disease, stroke, and CKD.     Provided patient with a 1841 calorie meal plan to assist with consistency, balance and portion control.  Encouraged the consumption of regular meals at regular times.  Advised patient to keep carbohydrate intake to 45-60 grams per meal and 15 per snack to assist with glycemic control.  Suggested keeping protein intake to 8 ounces a day and fat to 5 servings daily to assist with lipid management and calorie control. Portion booklet and food labels were used to teach basic carbohydrate counting. Patient agreed to keep daily  "food logs and return them in 2 months for assessment. RD will remain available for further dietary questions/concerns.     Ht Readings from Last 1 Encounters:   05/13/25 6' 1\" (1.854 m)     Wt Readings from Last 3 Encounters:   05/13/25 116 kg (255 lb)   04/07/25 120 kg (264 lb)   02/07/24 122 kg (270 lb)     Weight Change: Yes 10 lbs intentional weight loss in about 2 month time duration.    Barriers to Learning: no barriers    Do you follow any special diet presently?: Minimize oxalate  Who shops: patient and spouse  Who cooks: patient and spouse    Food Log: Completed via the method of food recall    Wakes up 7:30-8 am    Breakfast:8:30-9 am; 1 slice of bread (pumpernickel) w/ butter with a banana or 1/2 c blueberries  with coffee (1% milk, regular) OR eggs or egg whites and sometimes 1-2 slices of melendrez (no sugar, no salt, pork) OR 1 - 1 1/2 c plain cheerios with 1/2 c milk w/ blueberries or a whole banana or blackberries  Morning Snack:none  Lunch: 12:30-1 pm; pork tenderloin w/ ~7 oz baked potato with butter with broccoli (steamed w/ avocado or olive oil) and 3/4 c apple sauce (unsweetened w/ cinnamon) with unsweetened tea OR salmon (marinated w/hawaain)  with carrots and salad (spring mix, tomato, cucumber, onion, cheese, croutons, olives, sunflower seeds) and potatoes  Afternoon Snack: 6-8 wheat thins  Dinner: 5-5:30 pm; BLT and onion in a wrap (low carb, keto friendly) OR sandwich (roast beef) with slice of pumpernickel w/ L/T/O with spicy brown mustard OR tenderloin and vegetables  Evening Snack:8:30-9 pm; rice cakes (if plain then puts peanut butter  Beverages: coffee (1% milk, regular), tea (caffinated, unsweetened), water  Eating out/Take out:1x/week  Exercise Health & Wellness daily; 30 min on treadmill; a few miles on the bike; and resistance training     Calorie needs 1841 kcals/day Carbs: 45-60 g/meal, 15 g/snack     Protein:8 ounces/day    Fat: 5 servings/day        Nutrition Diagnosis:  Food and " nutrition related knowledge deficit  related to Lack of prior exposure to accurate nutrition related information as evidenced by Verbalizes inaccurate or incomplete information    Intervention: plate method, increased fiber intake, label reading, carbohydrate counting, meal timing, meal planning, exercise guidelines, and food diary     Treatment Goals: Patient understands education and recommendations, Patient will monitor food intake daily with tracker, Patient will consume 3 meals a day, Patient will count carbohydrates, and Patient will exercise    Monitoring and evaluation:    Term code indicator  FH 1.3.2 Food Intake Criteria: Eat 3 meals per day, 4-5 hours apart. No meal skipping. Eat your snack 2-3 hours away from your meals.  Term code indicator  FH 1.6.3 Carbohydrate Intake Criteria: Eat 45-60 grams of carbohydrate per meal and 15 grams per snack.  Term code indicator  CH 2.2 Treatments/Therapy/Alternative Medicine Criteria: Continue regular exercise to build to at least 30 minutes per day or 150 minutes of moderate exercise per week, and 2 days per week of resistance/strength training, pending physician approval.     Materials Provided: portion book, 3-day food log    Patient’s Response to Instruction:  Comprehensiongood  Motivationgood  Expected Compliancegood    Begin Time: 10:45 am  End Time: 12:01 pm  Referring Provider: Brooks Puente, DO    Thank you for coming to the [facility] for education today.  Please feel free to call with any questions or concerns.    Audrey Abdalla, RD  [id number] [id number] [phone]

## 2025-06-19 ENCOUNTER — OFFICE VISIT (OUTPATIENT)
Dept: ENDOCRINOLOGY | Facility: OTHER | Age: 75
End: 2025-06-19
Payer: COMMERCIAL

## 2025-06-19 VITALS — BODY MASS INDEX: 33.62 KG/M2 | WEIGHT: 254.8 LBS

## 2025-06-19 DIAGNOSIS — E11.69 TYPE 2 DIABETES MELLITUS WITH OTHER SPECIFIED COMPLICATION, WITHOUT LONG-TERM CURRENT USE OF INSULIN (HCC): Primary | ICD-10-CM

## 2025-06-19 PROCEDURE — 97802 MEDICAL NUTRITION INDIV IN: CPT

## 2025-06-19 NOTE — PATIENT INSTRUCTIONS
Eat 3 meals per day, 4-5 hours apart. No meal skipping. Eat your snack 2-3 hours away from your meals.    Eat 45-60 grams of carbohydrate per meal and 15 grams per snack.    Continue regular exercise to build to at least 30 minutes per day or 150 minutes of moderate exercise per week, and 2 days per week of resistance/strength training, pending physician approval.     Complete 3-day food log and return completed log at the follow up appointment

## 2025-06-30 ENCOUNTER — VBI (OUTPATIENT)
Dept: ADMINISTRATIVE | Facility: OTHER | Age: 75
End: 2025-06-30

## 2025-06-30 NOTE — TELEPHONE ENCOUNTER
06/30/25 3:24 PM     Chart reviewed for CRC: Colonoscopy ; nothing is submitted to the patient's insurance at this time.     Rosa Isela Watters [facility]

## 2025-07-15 ENCOUNTER — APPOINTMENT (OUTPATIENT)
Dept: LAB | Facility: MEDICAL CENTER | Age: 75
End: 2025-07-15
Attending: STUDENT IN AN ORGANIZED HEALTH CARE EDUCATION/TRAINING PROGRAM
Payer: COMMERCIAL

## 2025-07-15 DIAGNOSIS — E11.69 TYPE 2 DIABETES MELLITUS WITH OTHER SPECIFIED COMPLICATION, WITHOUT LONG-TERM CURRENT USE OF INSULIN (HCC): ICD-10-CM

## 2025-07-15 LAB
ANION GAP SERPL CALCULATED.3IONS-SCNC: 8 MMOL/L (ref 4–13)
BUN SERPL-MCNC: 19 MG/DL (ref 5–25)
CALCIUM SERPL-MCNC: 8.8 MG/DL (ref 8.4–10.2)
CHLORIDE SERPL-SCNC: 105 MMOL/L (ref 96–108)
CO2 SERPL-SCNC: 27 MMOL/L (ref 21–32)
CREAT SERPL-MCNC: 0.96 MG/DL (ref 0.6–1.3)
EST. AVERAGE GLUCOSE BLD GHB EST-MCNC: 134 MG/DL
GFR SERPL CREATININE-BSD FRML MDRD: 77 ML/MIN/1.73SQ M
GLUCOSE P FAST SERPL-MCNC: 106 MG/DL (ref 65–99)
HBA1C MFR BLD: 6.3 %
POTASSIUM SERPL-SCNC: 4 MMOL/L (ref 3.5–5.3)
SODIUM SERPL-SCNC: 140 MMOL/L (ref 135–147)

## 2025-07-15 PROCEDURE — 36415 COLL VENOUS BLD VENIPUNCTURE: CPT

## 2025-07-15 PROCEDURE — 80048 BASIC METABOLIC PNL TOTAL CA: CPT

## 2025-07-15 PROCEDURE — 83036 HEMOGLOBIN GLYCOSYLATED A1C: CPT
